# Patient Record
Sex: FEMALE | Race: WHITE | NOT HISPANIC OR LATINO | Employment: FULL TIME | URBAN - NONMETROPOLITAN AREA
[De-identification: names, ages, dates, MRNs, and addresses within clinical notes are randomized per-mention and may not be internally consistent; named-entity substitution may affect disease eponyms.]

---

## 2017-07-18 ENCOUNTER — OFFICE VISIT (OUTPATIENT)
Dept: FAMILY MEDICINE CLINIC | Facility: CLINIC | Age: 57
End: 2017-07-18

## 2017-07-18 VITALS
SYSTOLIC BLOOD PRESSURE: 112 MMHG | DIASTOLIC BLOOD PRESSURE: 74 MMHG | TEMPERATURE: 97.3 F | OXYGEN SATURATION: 98 % | HEIGHT: 63 IN | BODY MASS INDEX: 20.34 KG/M2 | HEART RATE: 70 BPM | WEIGHT: 114.8 LBS

## 2017-07-18 DIAGNOSIS — H60.501 ACUTE OTITIS EXTERNA OF RIGHT EAR, UNSPECIFIED TYPE: ICD-10-CM

## 2017-07-18 DIAGNOSIS — Z72.0 TOBACCO ABUSE: Chronic | ICD-10-CM

## 2017-07-18 DIAGNOSIS — R53.83 FATIGUE, UNSPECIFIED TYPE: ICD-10-CM

## 2017-07-18 DIAGNOSIS — H61.21 IMPACTED CERUMEN OF RIGHT EAR: ICD-10-CM

## 2017-07-18 DIAGNOSIS — R19.4 CHANGE IN STOOL HABITS: ICD-10-CM

## 2017-07-18 DIAGNOSIS — R63.4 WEIGHT LOSS, UNINTENTIONAL: ICD-10-CM

## 2017-07-18 DIAGNOSIS — Z12.31 VISIT FOR SCREENING MAMMOGRAM: ICD-10-CM

## 2017-07-18 DIAGNOSIS — F41.1 GAD (GENERALIZED ANXIETY DISORDER): Primary | Chronic | ICD-10-CM

## 2017-07-18 DIAGNOSIS — K21.00 GASTROESOPHAGEAL REFLUX DISEASE WITH ESOPHAGITIS: Chronic | ICD-10-CM

## 2017-07-18 PROCEDURE — 69210 REMOVE IMPACTED EAR WAX UNI: CPT | Performed by: INTERNAL MEDICINE

## 2017-07-18 PROCEDURE — 99214 OFFICE O/P EST MOD 30 MIN: CPT | Performed by: INTERNAL MEDICINE

## 2017-07-18 RX ORDER — NEOMYCIN SULFATE, POLYMYXIN B SULFATE AND HYDROCORTISONE 10; 3.5; 1 MG/ML; MG/ML; [USP'U]/ML
3 SUSPENSION/ DROPS AURICULAR (OTIC) 3 TIMES DAILY
Qty: 5 ML | Refills: 0 | Status: SHIPPED | OUTPATIENT
Start: 2017-07-18 | End: 2017-07-25

## 2017-07-18 NOTE — PROGRESS NOTES
Subjective        History of Present Illness     Reyna Mayfield is a 57 y.o. female who presents today to [facility] care.  Her  is a retired  and works at [employer].  She is a  at [employer] in [place].  Her medical history includes GERD, MALISSA, major depressive disorder, and history of colon polyps.  She reports past history of heart murmur, but has not been told this in several years.  She reports she was intolerant to antidepressants.  She denies current depression, but admits to anxiety.       Surgical history includes history of nissen fundoplication in [place] approximately 17 years ago.  She had cholecystectomy at age 22.  She was told to continue on Nexium following her nissen fundoplication.  She has not taken the Nexium in a couple of years because she was not having symptoms.        Family history includes diabetes and breast cancer.     She reports she is overdue for mammogram.  We will send an order for her to schedule mammogram.   She has a sister with breast cancer.  She had an appointment with Nita Lara for pap smear, which she cancelled.  I recommended she reschedule.     She reports history of post-cholecystectomy diarrhea.   She is reporting constipation with frequent hard painful stool.  She reports having bowel movements every two days. She is reporting change in stool with the caliber of the stool varying.    She denies GERD symptoms.  She reports an approximately 10-15 pound unintentional weight loss.  She reports she has had two colonoscopies, by Dr. Moyer and reporting one polyp found.  The most recent colonoscopy was 4-5 years ago.  She denies hemoptysis or evidence of GI blood loss.      She has a family of heart disease.  She does admit to tobacco use.  I advised the patient of the risks in continuing to use tobacco products.  Patient is urged to stop smoking and never start again. Cessation aids are offered.  She has nicotine patches  "and plans to start those.       She went to the ER on 07/08/17 and was diagnosed with vertigo.  She was then seen at Urgent Care on 07/15/17 and treated for right otitis media with a 10 day course of amoxicillin and Mucinex 600 b.i.d. as well as Debrox otic drops.  She reports the vertigo has improved.        Review of Systems   Constitutional: Positive for unexpected weight change. Negative for chills, fatigue and fever.   HENT: Negative for congestion, ear pain, postnasal drip, sinus pressure and sore throat.    Respiratory: Negative for cough, shortness of breath and wheezing.    Cardiovascular: Negative for chest pain, palpitations and leg swelling.   Gastrointestinal: Negative for abdominal pain, blood in stool, constipation, diarrhea, nausea and vomiting.   Endocrine: Negative for cold intolerance, heat intolerance, polydipsia and polyuria.   Genitourinary: Negative for dysuria, frequency, hematuria and urgency.   Skin: Negative for rash.   Neurological: Negative for syncope and weakness.      PHQ-9 Depression Screening 7/18/2017   Little interest or pleasure in doing things 1   Feeling down, depressed, or hopeless 0   PHQ-9 Total Score 1     Objective     Vitals:    07/18/17 0851   BP: 112/74   Pulse: 70   Temp: 97.3 °F (36.3 °C)   SpO2: 98%   Weight: 114 lb 12.8 oz (52.1 kg)   Height: 63\" (160 cm)     Physical Exam   Constitutional: She is oriented to person, place, and time. She appears well-developed and well-nourished. No distress.   Very thin female.    HENT:   Head: Normocephalic and atraumatic.   Nose: Right sinus exhibits no maxillary sinus tenderness and no frontal sinus tenderness. Left sinus exhibits no maxillary sinus tenderness and no frontal sinus tenderness.   Mouth/Throat: Uvula is midline, oropharynx is clear and moist and mucous membranes are normal. No oral lesions. No tonsillar exudate.   Benign posterior smoker's changes noted.  Left-sided nasal congestion.  Cerumen impaction right ear " canal.     Eyes: Conjunctivae and EOM are normal. Pupils are equal, round, and reactive to light.   Neck: Trachea normal. Neck supple. No JVD present. Carotid bruit is not present. No tracheal deviation present. No thyroid mass and no thyromegaly present.   Cardiovascular: Normal rate, regular rhythm and normal heart sounds.   No extrasystoles are present. PMI is not displaced.    No murmur heard.  Pulmonary/Chest: Effort normal and breath sounds normal. No accessory muscle usage. No respiratory distress. She has no decreased breath sounds. She has no wheezes. She has no rhonchi. She has no rales.   Abdominal: Soft. Bowel sounds are normal. She exhibits no distension. There is no hepatosplenomegaly. There is tenderness.   Significant epigastric tenderness.  Mild left lower quadrant abdominal tenderness.         Vascular Status -  Her exam exhibits right foot vasculature normal. Her exam exhibits no right foot edema. Her exam exhibits left foot vasculature normal. Her exam exhibits no left foot edema.  Lymphadenopathy:     She has no cervical adenopathy.   Neurological: She is alert and oriented to person, place, and time. No cranial nerve deficit. Coordination normal.   Skin: Skin is warm, dry and intact. No rash noted. No cyanosis. Nails show no clubbing.   Psychiatric: She has a normal mood and affect. Her speech is normal and behavior is normal. Thought content normal.   Vitals reviewed.         Assessment/Plan      With her unexplained weight loss,  change in bowel habits, fatigue, and history of tobacco use, we will get a chest x-ray  today.  I advised the patient of the risks in continuing to use tobacco products.  Patient is urged to stop smoking and never start again. Cessation aids are offered.  She reports she has nicotine patches at home and plans to start using these.      We will send an order for her to schedule mammogram.  Recommended she reschedule appointment for pap smear with Nita Hong  JACINDA Lara.      She will return in the morning to have labs completed including CBC, CMP, TSH, vitamin B-12, and vitamin D.  We will schedule a return visit in one month to review results.      Refer to GI, Dr. Masters, for evaluation of unintentional weight loss and change of bowel habits, as well as her history of GERD requiring Nissen fundoplication.    She had epigastric tenderness on abdominal exam today.    Cortisporin otic drops to the right ear for the next week.  I recommended Debrox eardrops monthly to both years for prevention of recurrence of cerumen impactions.    Ear Cerumen Removal Instrumentation  Date/Time: 7/18/2017 10:27 AM  Performed by: TATO LEE  Authorized by: TATO LEE  Local anesthetic: none  cerumenolytic applied: Ceruminolytics applied prior to the procedure.  Location details: right ear  Procedure type: irrigation  Patient sedated: no  Patient tolerance: Patient tolerated the procedure well with no immediate complications          After obtaining verbal informed consent, warm water lavage irrigation is performed today to right ear canal without difficulty or complication.        Scribed for Dr. Lee by Mihaela Calderón [facility].     Diagnoses and all orders for this visit:    MALISSA (generalized anxiety disorder)  -     Vitamin D 25 Hydroxy; Future    Weight loss, unintentional  -     CBC Auto Differential; Future  -     Comprehensive Metabolic Panel; Future  -     Vitamin D 25 Hydroxy; Future  -     XR Chest PA & Lateral  -     Ambulatory Referral to Gastroenterology    Tobacco abuse  -     Vitamin D 25 Hydroxy; Future  -     XR Chest PA & Lateral    Gastroesophageal reflux disease with esophagitis  -     Vitamin D 25 Hydroxy; Future  -     Ambulatory Referral to Gastroenterology    Change in stool habits  -     Vitamin D 25 Hydroxy; Future    Fatigue, unspecified type  -     Vitamin B12; Future  -     CBC Auto Differential; Future  -     Comprehensive Metabolic Panel; Future  -      TSH; Future  -     Vitamin D 25 Hydroxy; Future    Impacted cerumen of right ear    Visit for screening mammogram  -     Mammo Screening Digital Tomosynthesis Bilateral With CAD; Future    Acute otitis externa of right ear, unspecified type    Other orders  -     neomycin-polymyxin-hydrocortisone (CORTISPORIN) 3.5-74519-8 otic suspension; Administer 3 drops to the right ear 3 (Three) Times a Day for 7 days.      No visits with results within 3 Week(s) from this visit.  Latest known visit with results is:    Abstract on 02/22/2017   Component Date Value Ref Range Status   •  Mammogram 02/05/2016 completed   Final   •  Pap smear 02/10/2016 completed   Final   ]

## 2017-07-19 ENCOUNTER — LAB (OUTPATIENT)
Dept: LAB | Facility: OTHER | Age: 57
End: 2017-07-19

## 2017-07-19 DIAGNOSIS — R19.4 CHANGE IN STOOL HABITS: ICD-10-CM

## 2017-07-19 DIAGNOSIS — Z72.0 TOBACCO ABUSE: Chronic | ICD-10-CM

## 2017-07-19 DIAGNOSIS — R63.4 WEIGHT LOSS, UNINTENTIONAL: ICD-10-CM

## 2017-07-19 DIAGNOSIS — R53.83 FATIGUE, UNSPECIFIED TYPE: ICD-10-CM

## 2017-07-19 DIAGNOSIS — F41.1 GAD (GENERALIZED ANXIETY DISORDER): Chronic | ICD-10-CM

## 2017-07-19 DIAGNOSIS — K21.00 GASTROESOPHAGEAL REFLUX DISEASE WITH ESOPHAGITIS: Chronic | ICD-10-CM

## 2017-07-19 LAB
ALBUMIN SERPL-MCNC: 3.7 G/DL (ref 3.2–5.5)
ALBUMIN/GLOB SERPL: 1.3 G/DL (ref 1–3)
ALP SERPL-CCNC: 61 U/L (ref 15–121)
ALT SERPL W P-5'-P-CCNC: 16 U/L (ref 10–60)
ANION GAP SERPL CALCULATED.3IONS-SCNC: 7 MMOL/L (ref 5–15)
AST SERPL-CCNC: 19 U/L (ref 10–60)
BASOPHILS # BLD AUTO: 0.03 10*3/MM3 (ref 0–0.2)
BASOPHILS NFR BLD AUTO: 0.4 % (ref 0–2)
BILIRUB SERPL-MCNC: 0.3 MG/DL (ref 0.2–1)
BUN BLD-MCNC: 11 MG/DL (ref 8–25)
BUN/CREAT SERPL: 18.3 (ref 7–25)
CALCIUM SPEC-SCNC: 8.6 MG/DL (ref 8.4–10.8)
CHLORIDE SERPL-SCNC: 106 MMOL/L (ref 100–112)
CO2 SERPL-SCNC: 28 MMOL/L (ref 20–32)
CREAT BLD-MCNC: 0.6 MG/DL (ref 0.4–1.3)
DEPRECATED RDW RBC AUTO: 45.1 FL (ref 36.4–46.3)
EOSINOPHIL # BLD AUTO: 0.16 10*3/MM3 (ref 0–0.7)
EOSINOPHIL NFR BLD AUTO: 2.3 % (ref 0–7)
ERYTHROCYTE [DISTWIDTH] IN BLOOD BY AUTOMATED COUNT: 14 % (ref 11.5–14.5)
GFR SERPL CREATININE-BSD FRML MDRD: 104 ML/MIN/1.73 (ref 51–120)
GLOBULIN UR ELPH-MCNC: 2.8 GM/DL (ref 2.5–4.6)
GLUCOSE BLD-MCNC: 98 MG/DL (ref 70–100)
HCT VFR BLD AUTO: 42.3 % (ref 35–45)
HGB BLD-MCNC: 14.1 G/DL (ref 12–15.5)
LYMPHOCYTES # BLD AUTO: 2.97 10*3/MM3 (ref 0.6–4.2)
LYMPHOCYTES NFR BLD AUTO: 42.5 % (ref 10–50)
MCH RBC QN AUTO: 29.8 PG (ref 26.5–34)
MCHC RBC AUTO-ENTMCNC: 33.3 G/DL (ref 31.4–36)
MCV RBC AUTO: 89.4 FL (ref 80–98)
MONOCYTES # BLD AUTO: 0.4 10*3/MM3 (ref 0–0.9)
MONOCYTES NFR BLD AUTO: 5.7 % (ref 0–12)
NEUTROPHILS # BLD AUTO: 3.43 10*3/MM3 (ref 2–8.6)
NEUTROPHILS NFR BLD AUTO: 49.1 % (ref 37–80)
PLATELET # BLD AUTO: 162 10*3/MM3 (ref 150–450)
PMV BLD AUTO: 10.2 FL (ref 8–12)
POTASSIUM BLD-SCNC: 4.2 MMOL/L (ref 3.4–5.4)
PROT SERPL-MCNC: 6.5 G/DL (ref 6.7–8.2)
RBC # BLD AUTO: 4.73 10*6/MM3 (ref 3.77–5.16)
SODIUM BLD-SCNC: 141 MMOL/L (ref 134–146)
WBC NRBC COR # BLD: 6.99 10*3/MM3 (ref 3.2–9.8)

## 2017-07-19 PROCEDURE — 36415 COLL VENOUS BLD VENIPUNCTURE: CPT | Performed by: INTERNAL MEDICINE

## 2017-07-19 PROCEDURE — 80053 COMPREHEN METABOLIC PANEL: CPT | Performed by: INTERNAL MEDICINE

## 2017-07-19 PROCEDURE — 84443 ASSAY THYROID STIM HORMONE: CPT | Performed by: INTERNAL MEDICINE

## 2017-07-19 PROCEDURE — 82607 VITAMIN B-12: CPT | Performed by: INTERNAL MEDICINE

## 2017-07-19 PROCEDURE — 82306 VITAMIN D 25 HYDROXY: CPT | Performed by: INTERNAL MEDICINE

## 2017-07-19 PROCEDURE — 85025 COMPLETE CBC W/AUTO DIFF WBC: CPT | Performed by: INTERNAL MEDICINE

## 2017-07-20 LAB
25(OH)D3 SERPL-MCNC: 36.6 NG/ML (ref 30–100)
TSH SERPL DL<=0.05 MIU/L-ACNC: 1.71 MIU/ML (ref 0.46–4.68)
VIT B12 BLD-MCNC: 618 PG/ML (ref 239–931)

## 2017-07-25 ENCOUNTER — OFFICE VISIT (OUTPATIENT)
Dept: OBSTETRICS AND GYNECOLOGY | Facility: CLINIC | Age: 57
End: 2017-07-25

## 2017-07-25 VITALS
SYSTOLIC BLOOD PRESSURE: 110 MMHG | WEIGHT: 117.8 LBS | RESPIRATION RATE: 18 BRPM | HEIGHT: 63 IN | BODY MASS INDEX: 20.87 KG/M2 | DIASTOLIC BLOOD PRESSURE: 70 MMHG | HEART RATE: 65 BPM

## 2017-07-25 DIAGNOSIS — Z01.419 ENCOUNTER FOR GYNECOLOGICAL EXAMINATION WITH PAPANICOLAOU SMEAR OF CERVIX: Primary | ICD-10-CM

## 2017-07-25 PROCEDURE — 99396 PREV VISIT EST AGE 40-64: CPT | Performed by: NURSE PRACTITIONER

## 2017-07-25 PROCEDURE — 88142 CYTOPATH C/V THIN LAYER: CPT | Performed by: PATHOLOGY

## 2017-07-25 NOTE — PROGRESS NOTES
Subjective   Chief Complaint   Patient presents with   • Gynecologic Exam     well woman annual visit     Reyna Mayfield is a 57 y.o. year old  presenting to be seen for her annual exam.  Pt has no complaints.     She is not sexually active.  In the past 12 months there has not been new sexual partners.  Condoms are not typically used.  She would not like to be screened for STD's at today's exam.     She exercises regularly: no.  She wears her seat belt:yes.  She has concerns about domestic violence: no.  She is taking Vit D and Calcium:no  Last colonoscopy: approximately , is being referred to GI for unexplained weight loss and GI changes.   Last DEXA: Never  Last MM16  Last PAP: 2/3/16  Hx of abnormal pap: No      NATURAL MENOPAUSE  LMP: LMP 10 years ago    OB History      Para Term  AB TAB SAB Ectopic Multiple Living    3 3                No Additional Complaints Reported    The following portions of the patient's history were reviewed and updated as appropriate:problem list, current medications, allergies, past family history, past medical history, past social history and past surgical history.    Smoking status: Current Every Day Smoker                                                   Packs/day: 1.00      Years: 0.00      Smokeless status: Never Used                      Ready to quit: Yes  Counseling given: Yes      Review of Systems   Constitutional: Negative.    Respiratory: Negative.    Cardiovascular: Negative.    Gastrointestinal: Positive for abdominal pain and constipation.   Endocrine: Negative.    Genitourinary: Negative.  Negative for pelvic pain and vaginal pain.   Skin: Negative.    Neurological: Negative for dizziness, syncope, light-headedness and headaches.   Psychiatric/Behavioral: Negative for suicidal ideas. The patient is not nervous/anxious.          Objective   /70 (BP Location: Right arm, Patient Position: Sitting, Cuff Size: Adult)  Pulse 65  Resp 18  " Ht 63\" (160 cm)  Wt 117 lb 12.8 oz (53.4 kg)  LMP  (LMP Unknown)  Breastfeeding? No  BMI 20.87 kg/m2    General:  well developed; well nourished  no acute distress   Skin:  No suspicious lesions seen   Cardiac: Heart sounds are normal.  Regular rate and rhythm without murmur, gallop or rub.   Resp:  Normal expansion.  Clear to auscultation.  No rales, rhonchi, or wheezing.   Thyroid: not examined   Breasts:  Examined in supine position  Symmetric without masses or skin dimpling  Nipples normal without inversion, lesions or discharge  There are no palpable axillary nodes  Fibrocystic changes are present both breasts without a discrete mass   Abdomen: soft, non-tender; no masses  no umbilical or inginual hernias are present  no hepato-splenomegaly  Normal findings: bowel sounds normal   Psych: alert,oriented, in NAD with a full range of affect, normal behavior and no psychotic features   Pelvis: Clinical staff was present for exam  External genitalia:  normal appearance of the external genitalia including Bartholin's and Jessie's glands. small skin tag note on left buttock adjacent to anus  :  urethral meatus normal; urethral hypermobility is absent.  Vaginal:  normal pink mucosa without prolapse or lesions.  Cervix:  normal appearance.  Uterus:  normal size, shape and consistency.  Adnexa:  non palpable bilaterally.  Rectal:  anus visually normal appearing. recto-vaginal exam unremarkable and confirms findings; guaiac negative;     Lab Review   No data reviewed    Imaging  Mammogram report       Assessment   1. Encounter for GYN exam with pap smear of cervix     Plan   1. Pap results: I will send card in mail or call if abnormal. Pt states mammogram is scheduled next week. I see no record of scheduling. Diagnostics will call to schedule. RTC in 2 years for well woman exam.            This note was electronically signed.    Nita Solomon, JACINDA  July 25, 2017  "

## 2017-07-31 LAB
LAB AP CASE REPORT: NORMAL
LAB AP GYN ADDITIONAL INFORMATION: NORMAL
LAB AP GYN OTHER FINDINGS: NORMAL
Lab: NORMAL
PATH INTERP SPEC-IMP: NORMAL
STAT OF ADQ CVX/VAG CYTO-IMP: NORMAL

## 2017-08-22 ENCOUNTER — OFFICE VISIT (OUTPATIENT)
Dept: FAMILY MEDICINE CLINIC | Facility: CLINIC | Age: 57
End: 2017-08-22

## 2017-08-22 VITALS
WEIGHT: 118.4 LBS | HEART RATE: 64 BPM | BODY MASS INDEX: 20.98 KG/M2 | TEMPERATURE: 98.1 F | DIASTOLIC BLOOD PRESSURE: 58 MMHG | SYSTOLIC BLOOD PRESSURE: 96 MMHG | HEIGHT: 63 IN

## 2017-08-22 DIAGNOSIS — F41.1 GAD (GENERALIZED ANXIETY DISORDER): Chronic | ICD-10-CM

## 2017-08-22 DIAGNOSIS — G47.9 SLEEP DISORDER: ICD-10-CM

## 2017-08-22 DIAGNOSIS — Z86.010 HISTORY OF COLONIC POLYPS: Chronic | ICD-10-CM
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
## 2017-08-22 DIAGNOSIS — K21.00 GASTROESOPHAGEAL REFLUX DISEASE WITH ESOPHAGITIS: Chronic | ICD-10-CM

## 2017-08-22 DIAGNOSIS — Z72.0 TOBACCO ABUSE: Primary | Chronic | ICD-10-CM

## 2017-08-22 DIAGNOSIS — R63.4 WEIGHT LOSS, UNINTENTIONAL: Chronic | ICD-10-CM

## 2017-08-22 PROCEDURE — 99214 OFFICE O/P EST MOD 30 MIN: CPT | Performed by: INTERNAL MEDICINE

## 2017-08-22 RX ORDER — VARENICLINE TARTRATE 1 MG/1
0.5 TABLET, FILM COATED ORAL 2 TIMES DAILY
Qty: 30 TABLET | Refills: 6 | Status: SHIPPED | OUTPATIENT
Start: 2017-08-22 | End: 2017-08-31 | Stop reason: SDUPTHER

## 2017-08-22 NOTE — PROGRESS NOTES
Subjective        History of Present Illness     Reyna Mayfield is a 57 y.o. female who presents for a 1-month follow up on chronic conditions including GERD, MALISSA, major depressive disorder, and history of colon polyps.  Her  is a retired  and works at [employer].  She is a  at [employer] in [place].  She was here in July to establish care and returns today to review lab results.     She has an appointment with Renato Ugarte next month for unintentional weight loss and change of bowel habits.  She stopped her Nexium after her GERD symptoms improved.  Her weight is up 4 pounds in the past month.  She reports she has been consuming more calories.  Her recent labs do not reveal concerning liver abnormality or anemia.  She had cholecystectomy at age 22.  She also has history of nissen fundoplication 17 years ago    She continues to smoke about a pack of cigarettes daily.  I advised the patient of the risks in continuing to use tobacco products.  Patient is urged to stop smoking and never start again.  We discussed how cessation aids are covered by insurance under a new state law.  We discussed cessation aids and I recommended Chantix.   With her unexplained weight loss, change in bowel habits, fatigue, and history of tobacco use, we sent her for chest x-ray last month.  Chest x-ray revealed increased interstitial markings which may be chronic, but no nodule or mass was identified.      She continues to have difficulty staying asleep at night.  She has tried several antidepressants in the past, but felt these exacerbated her symptoms.  She reports she felt suicidal on the antidepressants.   She is currently using 1/2 tablet of melatonin to help sleep at night.  She reports she feels sedated the following day if she takes the whole tablet.       She has multiple pruritic insect bites, which being in the woods.  She has been applying clear nail polish without resolution.   I recommended  "applying hydrocortisone to the insect bites for the pruritis.       She had GYN exam and pap smear by Nita Lara.           She went to the ER on 07/08/17 and was diagnosed with vertigo.  She reports her symptoms resolved with a  10 day course of amoxicillin and Mucinex 600.     Blood pressure is at goal.      The patient's relevant past medical, surgical, and social history was reviewed in Epic.   Lab results are reviewed with the patient today. CBC unremarkable.  Fasting glucose 98.  Vitamin B-12 and vitamin D at goal.  Liver and renal function normal.  Thyroid function at goal.        Review of Systems   Constitutional: Negative for chills, fatigue and fever.   HENT: Negative for congestion, ear pain, postnasal drip, sinus pressure and sore throat.    Respiratory: Negative for cough, shortness of breath and wheezing.    Cardiovascular: Negative for chest pain, palpitations and leg swelling.   Gastrointestinal: Negative for abdominal pain, blood in stool, constipation, diarrhea, nausea and vomiting.   Endocrine: Negative for cold intolerance, heat intolerance, polydipsia and polyuria.   Genitourinary: Negative for dysuria, frequency, hematuria and urgency.   Skin: Negative for rash.   Neurological: Negative for syncope and weakness.      PHQ-9 Depression Screening 7/18/2017   Little interest or pleasure in doing things 1   Feeling down, depressed, or hopeless 0   PHQ-9 Total Score 1       Objective     Vitals:    08/22/17 1102   BP: 96/58   Pulse: 64   Temp: 98.1 °F (36.7 °C)   TempSrc: Oral   Weight: 118 lb 6.4 oz (53.7 kg)   Height: 63\" (160 cm)     Physical Exam   Constitutional: She is oriented to person, place, and time. She appears well-developed and well-nourished. No distress.   Very thin female.    HENT:   Head: Normocephalic and atraumatic.   Nose: Right sinus exhibits no maxillary sinus tenderness and no frontal sinus tenderness. Left sinus exhibits no maxillary sinus tenderness and no " frontal sinus tenderness.   Mouth/Throat: Uvula is midline, oropharynx is clear and moist and mucous membranes are normal. No oral lesions. No tonsillar exudate.   Benign posterior smoker's changes noted.     Eyes: Conjunctivae and EOM are normal. Pupils are equal, round, and reactive to light.   Neck: Trachea normal. Neck supple. No JVD present. Carotid bruit is not present. No tracheal deviation present. No thyroid mass and no thyromegaly present.   Cardiovascular: Normal rate, regular rhythm and normal heart sounds.   No extrasystoles are present. PMI is not displaced.    No murmur heard.  Pulmonary/Chest: Effort normal and breath sounds normal. No accessory muscle usage. No respiratory distress. She has no decreased breath sounds. She has no wheezes. She has no rhonchi. She has no rales.   Chronic lung sounds.    Abdominal: Soft. Bowel sounds are normal. She exhibits no distension. There is no hepatosplenomegaly. There is no tenderness.       Vascular Status -  Her exam exhibits right foot vasculature normal. Her exam exhibits no right foot edema. Her exam exhibits left foot vasculature normal. Her exam exhibits no left foot edema.  Lymphadenopathy:     She has no cervical adenopathy.   Neurological: She is alert and oriented to person, place, and time. No cranial nerve deficit. Coordination normal.   Skin: Skin is warm, dry and intact. No rash noted. No cyanosis. Nails show no clubbing.   A few scattered pruritic insect bites are noted on the back of the right shoulder and right posterior neck.  She reports some similar pruritic insect bites between the breasts.  No evidence of secondary cellulitis.  No annular rash.   Psychiatric: She has a normal mood and affect. Her speech is normal and behavior is normal. Thought content normal.   Vitals reviewed.    Future Appointments  Date Time Provider Department Center   9/19/2017 10:40 AM CARLINE Biswas [facility] None   7/23/2018 9:00 AM MD DANIE Carrera  [facility] None         Assessment/Plan      Start Chantix 1 mg to take 1/2 tablet each morning. She may need to increase to 1/2 tablet twice a day in a couple weeks if well tolerated.  Do not take the medication if she experiences significant side effects.  After she has decreased to approximately 1/2 pack of cigarettes daily, I recommended she pick a stop date.  We discussed importance of staying on the medication for six months for the maximum efficacy.      Monitor weight closely and notify me if there is any additional unexplained weight loss.  She will be seeing the GI service for a GI workup in September.  See last note for more details.    Continue with 1/2 tablet of the melatonin for sleep.      Recommended she apply hydrocortisone to the pruritic insect bites.      Keep the appointment with Renato Ugarte next month.     Continue other medications and vitamin and mineral supplements to treat additional medical problems which we addressed today.  She will follow up next summer for annual follow up with fasting labs one week prior.       Scribed for Dr. Cochran by Mihaela Calderón [facility].     Diagnoses and all orders for this visit:    Tobacco abuse    Weight loss, unintentional  -     Comprehensive Metabolic Panel; Future  -     CBC Auto Differential; Future  -     TSH; Future    Gastroesophageal reflux disease with esophagitis  -     Comprehensive Metabolic Panel; Future  -     CBC Auto Differential; Future  -     TSH; Future    MALISSA (generalized anxiety disorder)    History of colonic polyps    Sleep disorder    Insect bites, initial encounter    Other orders  -     varenicline (CHANTIX CONTINUING MONTH PAK) 1 MG tablet; Take 0.5 tablets by mouth 2 (Two) Times a Day.        No visits with results within 3 Week(s) from this visit.  Latest known visit with results is:    Office Visit on 07/25/2017   Component Date Value Ref Range Status   • Case Report 07/25/2017    Final                    Value:Gynecologic  Cytology Report                       Case: [id number]                                  Authorizing Provider:  Nita Duvalgh              Collected:           07/25/2017 11:05 AM                                 JACINDA Solomon                                                         Ordering Location:     [facility]     Received:            07/25/2017 11:35 AM                                 [facility]                                                               First Screen:          Nyla Graff                                                              Rescreen:              Benjamin Gavin MD                                                         Specimen:    Liquid-Based Pap, Screening, Cervix                                                       • Interpretation 07/25/2017 Negative for intraepithelial lesion or malignancy    Final   • Other Findings 07/25/2017 Mild inflammation   Final   • Specimen Adequacy 07/25/2017 Satisfactory for evaluation, endocervical/transformation zone component present   Final   • Additional Information 07/25/2017    Final                    Value:This result contains rich text formatting which cannot be displayed here.   ]

## 2017-08-23 ENCOUNTER — DOCUMENTATION (OUTPATIENT)
Dept: FAMILY MEDICINE CLINIC | Facility: CLINIC | Age: 57
End: 2017-08-23

## 2017-08-23 NOTE — PROGRESS NOTES
Insurance requires that Chantix fills through mail order.  I have notified the patient to contact the pcp to have this medication sent to mail order when she gets a mail order account established if not already.

## 2017-08-25 RX ORDER — MECLIZINE HYDROCHLORIDE 25 MG/1
25 TABLET ORAL 3 TIMES DAILY PRN
Qty: 60 TABLET | Refills: 2 | Status: SHIPPED | OUTPATIENT
Start: 2017-08-25 | End: 2018-03-20 | Stop reason: SDUPTHER

## 2017-08-31 RX ORDER — VARENICLINE TARTRATE 1 MG/1
0.5 TABLET, FILM COATED ORAL 2 TIMES DAILY
Qty: 90 TABLET | Refills: 1 | Status: SHIPPED | OUTPATIENT
Start: 2017-08-31 | End: 2017-11-06 | Stop reason: ALTCHOICE

## 2017-09-19 ENCOUNTER — OFFICE VISIT (OUTPATIENT)
Dept: GASTROENTEROLOGY | Facility: CLINIC | Age: 57
End: 2017-09-19

## 2017-09-19 VITALS
HEIGHT: 63 IN | WEIGHT: 119 LBS | DIASTOLIC BLOOD PRESSURE: 68 MMHG | BODY MASS INDEX: 21.09 KG/M2 | SYSTOLIC BLOOD PRESSURE: 108 MMHG | HEART RATE: 63 BPM

## 2017-09-19 DIAGNOSIS — R19.4 CHANGE IN BOWEL HABITS: ICD-10-CM

## 2017-09-19 DIAGNOSIS — R19.7 DIARRHEA, UNSPECIFIED TYPE: ICD-10-CM

## 2017-09-19 DIAGNOSIS — R63.4 WEIGHT LOSS, ABNORMAL: Primary | ICD-10-CM

## 2017-09-19 DIAGNOSIS — K21.9 GASTROESOPHAGEAL REFLUX DISEASE, ESOPHAGITIS PRESENCE NOT SPECIFIED: ICD-10-CM

## 2017-09-19 PROCEDURE — 99243 OFF/OP CNSLTJ NEW/EST LOW 30: CPT | Performed by: PHYSICIAN ASSISTANT

## 2017-09-19 RX ORDER — DEXTROSE AND SODIUM CHLORIDE 5; .45 G/100ML; G/100ML
30 INJECTION, SOLUTION INTRAVENOUS CONTINUOUS PRN
Status: CANCELLED | OUTPATIENT
Start: 2017-11-10

## 2017-09-19 RX ORDER — SODIUM, POTASSIUM,MAG SULFATES 17.5-3.13G
1 SOLUTION, RECONSTITUTED, ORAL ORAL ONCE
Qty: 1 BOTTLE | Refills: 0 | Status: SHIPPED | OUTPATIENT
Start: 2017-09-19 | End: 2017-09-19

## 2017-10-27 ENCOUNTER — APPOINTMENT (OUTPATIENT)
Dept: ULTRASOUND IMAGING | Facility: HOSPITAL | Age: 57
End: 2017-10-27

## 2017-11-10 ENCOUNTER — HOSPITAL ENCOUNTER (OUTPATIENT)
Facility: HOSPITAL | Age: 57
Setting detail: HOSPITAL OUTPATIENT SURGERY
Discharge: HOME | End: 2017-11-10
Attending: INTERNAL MEDICINE | Admitting: INTERNAL MEDICINE

## 2017-11-10 ENCOUNTER — APPOINTMENT (OUTPATIENT)
Dept: ULTRASOUND IMAGING | Facility: HOSPITAL | Age: 57
End: 2017-11-10

## 2017-11-10 ENCOUNTER — HOSPITAL ENCOUNTER (OUTPATIENT)
Dept: ULTRASOUND IMAGING | Facility: HOSPITAL | Age: 57
Discharge: HOME | End: 2017-11-10

## 2017-11-10 ENCOUNTER — ANESTHESIA (OUTPATIENT)
Dept: GASTROENTEROLOGY | Facility: HOSPITAL | Age: 57
End: 2017-11-10

## 2017-11-10 ENCOUNTER — ANESTHESIA EVENT (OUTPATIENT)
Dept: GASTROENTEROLOGY | Facility: HOSPITAL | Age: 57
End: 2017-11-10

## 2017-11-10 VITALS
DIASTOLIC BLOOD PRESSURE: 59 MMHG | HEART RATE: 68 BPM | TEMPERATURE: 97.1 F | SYSTOLIC BLOOD PRESSURE: 94 MMHG | RESPIRATION RATE: 17 BRPM | BODY MASS INDEX: 20.47 KG/M2 | WEIGHT: 115.52 LBS | HEIGHT: 63 IN | OXYGEN SATURATION: 99 %

## 2017-11-10 DIAGNOSIS — R19.4 CHANGE IN BOWEL HABITS: ICD-10-CM

## 2017-11-10 DIAGNOSIS — R63.4 WEIGHT LOSS, ABNORMAL: ICD-10-CM

## 2017-11-10 DIAGNOSIS — K21.9 GASTROESOPHAGEAL REFLUX DISEASE, ESOPHAGITIS PRESENCE NOT SPECIFIED: ICD-10-CM

## 2017-11-10 DIAGNOSIS — R19.7 DIARRHEA, UNSPECIFIED TYPE: ICD-10-CM

## 2017-11-10 PROCEDURE — 45380 COLONOSCOPY AND BIOPSY: CPT | Performed by: INTERNAL MEDICINE

## 2017-11-10 PROCEDURE — 76700 US EXAM ABDOM COMPLETE: CPT

## 2017-11-10 PROCEDURE — 88313 SPECIAL STAINS GROUP 2: CPT | Performed by: INTERNAL MEDICINE

## 2017-11-10 PROCEDURE — 88313 SPECIAL STAINS GROUP 2: CPT | Performed by: PATHOLOGY

## 2017-11-10 PROCEDURE — 25010000002 PROPOFOL 10 MG/ML EMULSION: Performed by: NURSE ANESTHETIST, CERTIFIED REGISTERED

## 2017-11-10 PROCEDURE — 88305 TISSUE EXAM BY PATHOLOGIST: CPT | Performed by: PATHOLOGY

## 2017-11-10 PROCEDURE — 43239 EGD BIOPSY SINGLE/MULTIPLE: CPT | Performed by: INTERNAL MEDICINE

## 2017-11-10 PROCEDURE — 88305 TISSUE EXAM BY PATHOLOGIST: CPT | Performed by: INTERNAL MEDICINE

## 2017-11-10 RX ORDER — PROMETHAZINE HYDROCHLORIDE 25 MG/ML
12.5 INJECTION, SOLUTION INTRAMUSCULAR; INTRAVENOUS ONCE AS NEEDED
Status: DISCONTINUED | OUTPATIENT
Start: 2017-11-10 | End: 2017-11-10 | Stop reason: HOSPADM

## 2017-11-10 RX ORDER — LIDOCAINE HYDROCHLORIDE 10 MG/ML
INJECTION, SOLUTION INFILTRATION; PERINEURAL AS NEEDED
Status: DISCONTINUED | OUTPATIENT
Start: 2017-11-10 | End: 2017-11-10 | Stop reason: SURG

## 2017-11-10 RX ORDER — DEXAMETHASONE SODIUM PHOSPHATE 4 MG/ML
8 INJECTION, SOLUTION INTRA-ARTICULAR; INTRALESIONAL; INTRAMUSCULAR; INTRAVENOUS; SOFT TISSUE ONCE AS NEEDED
Status: DISCONTINUED | OUTPATIENT
Start: 2017-11-10 | End: 2017-11-10 | Stop reason: HOSPADM

## 2017-11-10 RX ORDER — PROMETHAZINE HYDROCHLORIDE 25 MG/1
25 TABLET ORAL ONCE AS NEEDED
Status: DISCONTINUED | OUTPATIENT
Start: 2017-11-10 | End: 2017-11-10 | Stop reason: HOSPADM

## 2017-11-10 RX ORDER — PROPOFOL 10 MG/ML
VIAL (ML) INTRAVENOUS AS NEEDED
Status: DISCONTINUED | OUTPATIENT
Start: 2017-11-10 | End: 2017-11-10 | Stop reason: SURG

## 2017-11-10 RX ORDER — PROMETHAZINE HYDROCHLORIDE 25 MG/1
25 SUPPOSITORY RECTAL ONCE AS NEEDED
Status: DISCONTINUED | OUTPATIENT
Start: 2017-11-10 | End: 2017-11-10 | Stop reason: HOSPADM

## 2017-11-10 RX ORDER — DEXTROSE AND SODIUM CHLORIDE 5; .45 G/100ML; G/100ML
30 INJECTION, SOLUTION INTRAVENOUS CONTINUOUS PRN
Status: DISCONTINUED | OUTPATIENT
Start: 2017-11-10 | End: 2017-11-10 | Stop reason: HOSPADM

## 2017-11-10 RX ORDER — ONDANSETRON 2 MG/ML
4 INJECTION INTRAMUSCULAR; INTRAVENOUS ONCE AS NEEDED
Status: DISCONTINUED | OUTPATIENT
Start: 2017-11-10 | End: 2017-11-10 | Stop reason: HOSPADM

## 2017-11-10 RX ADMIN — LIDOCAINE HYDROCHLORIDE 50 MG: 10 INJECTION, SOLUTION INFILTRATION; PERINEURAL at 09:39

## 2017-11-10 RX ADMIN — PROPOFOL 30 MG: 10 INJECTION, EMULSION INTRAVENOUS at 09:44

## 2017-11-10 RX ADMIN — DEXTROSE AND SODIUM CHLORIDE 30 ML/HR: 5; 450 INJECTION, SOLUTION INTRAVENOUS at 09:16

## 2017-11-10 RX ADMIN — PROPOFOL 30 MG: 10 INJECTION, EMULSION INTRAVENOUS at 09:42

## 2017-11-10 RX ADMIN — PROPOFOL 50 MG: 10 INJECTION, EMULSION INTRAVENOUS at 09:39

## 2017-11-10 RX ADMIN — PROPOFOL 40 MG: 10 INJECTION, EMULSION INTRAVENOUS at 09:52

## 2017-11-10 NOTE — PLAN OF CARE
Problem: Patient Care Overview (Adult)  Goal: Plan of Care Review  Outcome: Outcome(s) achieved Date Met:  11/10/17    11/10/17 1019   Coping/Psychosocial Response Interventions   Plan Of Care Reviewed With patient   Patient Care Overview   Progress no change   Outcome Evaluation   Outcome Summary/Follow up Plan vss, pt alert         Problem: GI Endoscopy (Adult)  Goal: Signs and Symptoms of Listed Potential Problems Will be Absent or Manageable (GI Endoscopy)  Outcome: Outcome(s) achieved Date Met:  11/10/17    11/10/17 1019   GI Endoscopy   Problems Assessed (GI Endoscopy) all   Problems Present (GI Endoscopy) none

## 2017-11-10 NOTE — H&P
Progress Notes  Encounter Date: 11/10/2017  Renato Ugarte PA-C   Gastroenterology   Expand All Collapse All    []Hide copied text  []Hover for attribution information       Chief Complaint   Patient presents with   • Weight Loss       Ref. Dr. Cochran   • Heartburn         ENDO PROCEDURE ORDERED: EGD/COLON, bx, weight loss, diarrhea, change bm, reflux        Subjective        Reyna Mayfield is a 57 y.o. female. she is being seen for consultation today at the request of Nitin Cochran MD     History of Present Illness     This 57-year-old female who works at the [employer] was sent for consultation for weight loss and change in bowel habits by Dr. Cochran who saw the patient on 8/22/17.  She thinks she had a colonoscopy with possible colon polyp more than 5 years ago by Dr. Moyer.  Recent laboratory on 7/19/17 showed normal B12, CBC, TSH, vitamin D, CMP showed a protein 6.5, otherwise normal.  Patient currently denies abdominal pain, she states she previously had severe heartburn but now takes Maalox or Tums and does fairly well.  She states years ago she had had antireflux treatment and was having dysphagia.  She currently denies nausea, vomiting, she denies any pain with eating or drinking.  She was having some tenderness in the epigastric region.  She states she normally has loose stool since cholecystectomy.  Now she states she can't have a bowel movement, she gets severe pain with straining in the left lower quadrant, she feels like she will pass out.  Her stools will not drop.  Sometimes they are hard, sometimes she will have diarrhea.  She seen no blood in her stool.  No color changes, no mucus.  Patient states she has lost a lot of weight, she states she had gotten down to 112 pounds from a normal of 120 pounds.     Patient is a pack-a-day smoker for many years, strongly encouraged quit.  Denied alcohol, illicit substance use.  She's had a previous Nissen fundoplication, tubal ligation,  cholecystectomy, shoulder surgery.  Family history of diabetes, heart disease, gallstones, breast cancer and a sister, no known family history GI tract malignancy.  Father  age 94th diabetes.  Mother  age 80 with heart disease and diabetes.  Spouse in apparent good health,  since .  3 brothers, 2 sisters still living 1 sisters had breast cancer.  3 children in good health.     A/P: Patient is moderately tender in the mid-upper abdomen with previous history of GERD, change in bowel habits, significant weight loss, certainly would have concern for possible malignancy.  Recommend EGD/colonoscopy with biopsies to further evaluate.  She has a history of colon polyp possibly.  Recommend abdominal ultrasound to look at her liver and kidneys.  We'll check urinalysis with microscopy.  Would consider further imaging including her chest given her long-standing smoking history.  We'll see her in follow-up after the above, further pending clinical course and the results of the above.     Thank you very much Dr. Cochran for this consultation, and for allowing us to participate in the care of your patient.  We'll keep you informed.      The following portions of the patient's history were reviewed and updated as appropriate:    Medical History         Past Medical History:   Diagnosis Date   • Drug withdrawal     • MALISSA (generalized anxiety disorder)     • GERD (gastroesophageal reflux disease)     • History of colonic polyps     • Insomnia     • Major depressive disorder, recurrent, moderate     • Pain in pelvis       Discomfort more than pain      • Pelvic floor relaxation     • Tobacco abuse 2017          Surgical History          Past Surgical History:   Procedure Laterality Date   • COLONOSCOPY        yearly due to polyps   • ESOPHAGUS SURGERY       • LAPAROSCOPIC CHOLECYSTECTOMY         @ 23 y/o   • OTHER SURGICAL HISTORY   2014     Remove Impacted Cerumen 86681 (1)      • PAP SMEAR    2015     normal   • SHOULDER SURGERY Left    • TUBAL ABDOMINAL LIGATION                  Family History   Problem Relation Age of Onset   • Diabetes Mother     • Heart disease Mother     • Diabetes Father     • Osteoporosis Sister     • Colon cancer Neg Hx     • Stomach cancer Neg Hx        OB History      Para Term  AB Living     3 3 3          SAB TAB Ectopic Multiple Live Births                       No Known Allergies   Social History    Social History            Social History   • Marital status:        Spouse name: N/A   • Number of children: N/A   • Years of education: N/A            Social History Main Topics   • Smoking status: Current Every Day Smoker       Packs/day: 1.00   • Smokeless tobacco: Never Used   • Alcohol use No   • Drug use: No   • Sexual activity: No           Other Topics Concern   • None      Social History Narrative            Current Outpatient Prescriptions:   •  aspirin 81 MG tablet, Take 81 mg by mouth Daily., Disp: , Rfl:   •  Black Cohosh 540 MG capsule, Take 1 tablet by mouth Daily., Disp: , Rfl:   •  meclizine (ANTIVERT) 25 MG tablet, Take 1 tablet by mouth 3 (Three) Times a Day As Needed for dizziness., Disp: 60 tablet, Rfl: 2  •  Multiple Vitamins-Calcium (ONE-A-DAY WOMENS PO), Take  by mouth Daily., Disp: , Rfl:   •  ondansetron ODT (ZOFRAN-ODT) 4 MG disintegrating tablet, Take 4 mg by mouth Every 8 (Eight) Hours As Needed for Nausea., Disp: , Rfl: 0  •  varenicline (CHANTIX CONTINUING MONTH ISHA) 1 MG tablet, Take 0.5 tablets by mouth 2 (Two) Times a Day., Disp: 90 tablet, Rfl: 1  Review of Systems  Review of Systems   Constitutional: Positive for unexpected weight change.   HENT: Negative for tinnitus, trouble swallowing and voice change.    Cardiovascular: Negative for chest pain.   Gastrointestinal: Positive for abdominal pain, constipation, diarrhea and nausea. Negative for abdominal distention, anal bleeding, blood in stool, rectal pain  "and vomiting.   Genitourinary: Positive for frequency and urgency.   Musculoskeletal: Positive for back pain.   All other systems reviewed and are negative.                                 Objective        /68 (BP Location: Left arm)  Pulse 63  Ht 63\" (160 cm)  Wt 119 lb (54 kg)  LMP  (LMP Unknown)  BMI 21.08 kg/m2  Physical Exam   Constitutional: She is oriented to person, place, and time. She appears well-developed and well-nourished. No distress.   HENT:   Head: Normocephalic and atraumatic.   Eyes: EOM are normal. Pupils are equal, round, and reactive to light.   Neck: Normal range of motion.   Cardiovascular: Normal rate, regular rhythm and normal heart sounds.    Pulmonary/Chest: Effort normal and breath sounds normal.   Abdominal: Soft. Bowel sounds are normal. She exhibits no shifting dullness, no distension, no abdominal bruit, no ascites and no mass. There is no hepatosplenomegaly. There is tenderness. There is no rigidity, no rebound, no guarding and no CVA tenderness. No hernia. Hernia confirmed negative in the ventral area.   Mild diffuse   Musculoskeletal: Normal range of motion.   Neurological: She is alert and oriented to person, place, and time.   Skin: Skin is warm and dry.   Psychiatric: She has a normal mood and affect. Her behavior is normal. Judgment and thought content normal.   Nursing note and vitals reviewed.        Assessment/Plan           1. Weight loss, abnormal    2. Change in bowel habits    3. Gastroesophageal reflux disease, esophagitis presence not specified    4. Diarrhea, unspecified type    .   Reyna was seen today for weight loss and heartburn.     Diagnoses and all orders for this visit:     Weight loss, abnormal  -     Urine Culture  -     Urinalysis With Microscopic  -     US Abdomen Complete  -     Case Request; Standing  -     dextrose 5 % and sodium chloride 0.45 % infusion; Infuse 30 mL/hr into a venous catheter Continuous As Needed (Start Prior to " Procedure).  -     Case Request     Change in bowel habits  -     Urine Culture  -     Urinalysis With Microscopic  -     US Abdomen Complete  -     Case Request; Standing  -     dextrose 5 % and sodium chloride 0.45 % infusion; Infuse 30 mL/hr into a venous catheter Continuous As Needed (Start Prior to Procedure).  -     Case Request     Gastroesophageal reflux disease, esophagitis presence not specified  -     Urine Culture  -     Urinalysis With Microscopic  -     US Abdomen Complete  -     Case Request; Standing  -     dextrose 5 % and sodium chloride 0.45 % infusion; Infuse 30 mL/hr into a venous catheter Continuous As Needed (Start Prior to Procedure).  -     Case Request     Diarrhea, unspecified type  -     Urine Culture  -     Urinalysis With Microscopic  -     US Abdomen Complete  -     Case Request; Standing  -     dextrose 5 % and sodium chloride 0.45 % infusion; Infuse 30 mL/hr into a venous catheter Continuous As Needed (Start Prior to Procedure).  -     Case Request     Other orders  -     sodium-potassium-magnesium sulfates (SUPREP BOWEL PREP KIT) 17.5-3.13-1.6 GM/180ML solution oral solution; Take 1 bottle by mouth 1 (One) Time for 1 dose. Take as per instruction sheet for colonoscopy prep.  -     Obtain Informed Consent; Standing  -     POC Glucose Fingerstick; Standing           Orders placed during this encounter include:        Orders Placed This Encounter   Procedures   • Urine Culture   • US Abdomen Complete       Order Specific Question:   Reason for Exam:       Answer:   abd pain, wt loss   • Urinalysis With Microscopic         Medications prescribed:       New Medications Ordered This Visit   Medications   • sodium-potassium-magnesium sulfates (SUPREP BOWEL PREP KIT) 17.5-3.13-1.6 GM/180ML solution oral solution       Sig: Take 1 bottle by mouth 1 (One) Time for 1 dose. Take as per instruction sheet for colonoscopy prep.       Dispense:  1 bottle       Refill:  0      Discontinued  Medications        Reason for Discontinue     carbamide peroxide (DEBROX) 6.5 % otic solution Therapy completed         Requested Prescriptions             Signed Prescriptions Disp Refills   • sodium-potassium-magnesium sulfates (SUPREP BOWEL PREP KIT) 17.5-3.13-1.6 GM/180ML solution oral solution 1 bottle 0       Sig: Take 1 bottle by mouth 1 (One) Time for 1 dose. Take as per instruction sheet for colonoscopy prep.         Review and/or summary of lab tests, radiology, procedures, medications. Review and summary of old records and obtaining of history. The risks and benefits of my recommendations, as well as other treatment options were discussed with the patient today. Questions were answered.     Follow-up: Return if symptoms worsen or fail to improve, for After the above.     ESOPHAGOGASTRODUODENOSCOPY (u/s prior)  (N/A), COLONOSCOPY (N/A)         This document has been electronically signed by Power Harris MD on November 10, 2017 9:18 AM              Results for orders placed or performed in visit on 07/25/17   Liquid-based Pap Smear, Screening   Result Value Ref Range     Case Report           Gynecologic Cytology Report                       Case: [id number]                                  Authorizing Provider:  Nita Goyal              Collected:           07/25/2017 11:05 AM                                 JACINDA Solomon                                                         Ordering Location:     [facility]     Received:            07/25/2017 11:35 AM                                 [facility]                                                               First Screen:          Nyla Graff                                                              Rescreen:              Benjamin Gavin MD                                                         Specimen:    Liquid-Based Pap, Screening, Cervix                                                       Interpretation Negative  for intraepithelial lesion or malignancy        Other Findings Mild inflammation       Specimen Adequacy           Satisfactory for evaluation, endocervical/transformation zone component present     Additional Information           Disclaimer: Cervical cytology is a screening test primarily for squamous cancer and its precursors and has associated false-negative and false-positive results.  Technologies such as liquid-based preparations may decrease but will not eliminate all false-negative results.  Follow-up of unexplained clinical signs and symptoms is recommended to minimize false-negative results. (The [place] System for Reporting Cervical Cytology: [employer], 2015).     Embedded Images       Results for orders placed or performed in visit on 07/19/17   CBC Auto Differential   Result Value Ref Range     WBC 6.99 3.20 - 9.80 10*3/mm3     RBC 4.73 3.77 - 5.16 10*6/mm3     Hemoglobin 14.1 12.0 - 15.5 g/dL     Hematocrit 42.3 35.0 - 45.0 %     MCV 89.4 80.0 - 98.0 fL     MCH 29.8 26.5 - 34.0 pg     MCHC 33.3 31.4 - 36.0 g/dL     RDW 14.0 11.5 - 14.5 %     RDW-SD 45.1 36.4 - 46.3 fl     MPV 10.2 8.0 - 12.0 fL     Platelets 162 150 - 450 10*3/mm3     Neutrophil % 49.1 37.0 - 80.0 %     Lymphocyte % 42.5 10.0 - 50.0 %     Monocyte % 5.7 0.0 - 12.0 %     Eosinophil % 2.3 0.0 - 7.0 %     Basophil % 0.4 0.0 - 2.0 %     Neutrophils, Absolute 3.43 2.00 - 8.60 10*3/mm3     Lymphocytes, Absolute 2.97 0.60 - 4.20 10*3/mm3     Monocytes, Absolute 0.40 0.00 - 0.90 10*3/mm3     Eosinophils, Absolute 0.16 0.00 - 0.70 10*3/mm3     Basophils, Absolute 0.03 0.00 - 0.20 10*3/mm3   Vitamin D 25 Hydroxy   Result Value Ref Range     25 Hydroxy, Vitamin D 36.6 30.0 - 100.0 ng/ml   TSH   Result Value Ref Range     TSH 1.710 0.460 - 4.680 mIU/mL   Vitamin B12   Result Value Ref Range     Vitamin B-12 618 239 - 931 pg/mL   Comprehensive Metabolic Panel   Result Value Ref Range     Glucose 98 70 - 100 mg/dL     BUN 11 8 - 25 mg/dL      Creatinine 0.60 0.40 - 1.30 mg/dL     Sodium 141 134 - 146 mmol/L     Potassium 4.2 3.4 - 5.4 mmol/L     Chloride 106 100 - 112 mmol/L     CO2 28.0 20.0 - 32.0 mmol/L     Calcium 8.6 8.4 - 10.8 mg/dL     Total Protein 6.5 (L) 6.7 - 8.2 g/dL     Albumin 3.70 3.20 - 5.50 g/dL     ALT (SGPT) 16 10 - 60 U/L     AST (SGOT) 19 10 - 60 U/L     Alkaline Phosphatase 61 15 - 121 U/L     Total Bilirubin 0.3 0.2 - 1.0 mg/dL     eGFR Non  Amer 104 51 - 120 mL/min/1.73     Globulin 2.8 2.5 - 4.6 gm/dL     A/G Ratio 1.3 1.0 - 3.0 g/dL     BUN/Creatinine Ratio 18.3 7.0 - 25.0     Anion Gap 7.0 5.0 - 15.0 mmol/L   Results for orders placed or performed in visit on 02/22/17    PAP SMEAR   Result Value Ref Range      Pap smear completed      MAMMOGRAPHY   Result Value Ref Range      Mammogram completed     Results for orders placed or performed during the hospital encounter of 02/03/16   Estrogens, fractionated   Result Value Ref Range     Estrone 39 pg/mL     Estradiol <5.0 pg/mL   Progesterone   Result Value Ref Range     Progesterone <0.1 ng/mL   TSH   Result Value Ref Range     TSH 3.45 0.46 - 4.68 uIU/ml   T4, free   Result Value Ref Range     Free T4 1.42 0.78 - 2.19 ng/dl   Results for orders placed or performed during the hospital encounter of 02/03/16   Converted (Historical) Gyn Cytology   Result Value Ref Range     Spec Descr 1 SPECIMEN(S): Cervical/Endocervical       Clinical Information           CLINICAL HISTORY: Reason for Pap Smear: Routine Smear, LMP: 9 YEARS AGO     ICD9 Diagnosis Code 1 ICD-9: Z01.419     HPV DNA probe, direct   Result Value Ref Range     HPV Aptima Negative Negative   Results for orders placed or performed in visit on 04/23/15   Rapid drug screen, urine   Result Value Ref Range     Barbiturates Screen, Urine Negative NEGATIVE     Benzodiazepine Screen, Urine Negative NEGATIVE     Opiate Screen, Urine Negative NEGATIVE     THC Screen Interpretation Negative NEGATIVE      Amphet/Methamphet, Screen, Urine POSITIVE (A) NEGATIVE     Cocaine Screen, Urine Negative NEGATIVE     Oxycodone Screen, Urine Negative NEGATIVE     Methadone Screen, Urine Negative NEGATIVE   Results for orders placed or performed in visit on 02/24/15   H. pylori antibody, IgG   Result Value Ref Range     H pylori Abs (Qual) Negative     Results for orders placed or performed in visit on 02/04/15   Rapid drug screen, urine   Result Value Ref Range     Barbiturates Screen, Urine Negative NEGATIVE     Benzodiazepine Screen, Urine Negative NEGATIVE     Opiate Screen, Urine Negative NEGATIVE     THC Screen Interpretation Negative NEGATIVE     Amphet/Methamphet, Screen, Urine Negative NEGATIVE     Cocaine Screen, Urine Negative NEGATIVE     Oxycodone Screen, Urine Negative NEGATIVE     Methadone Screen, Urine Negative NEGATIVE   CBC and Differential   Result Value Ref Range     WBC 6.6 3.2 - 9.8 x1000/uL     RBC 4.71 3.77 - 5.16 alli/mm3     Hemoglobin 14.2 12.0 - 15.5 gm/dl     Hematocrit 43.1 35.0 - 45.0 %     MCV 91.5 80.0 - 98.0 fl     MCH 30.1 26.0 - 34.0 pg     MCHC 32.9 31.4 - 36.0 gm/dl     Platelets 176 150 - 450 x1000/mm3     RDW 13.9 11.5 - 14.5 %     MPV 11.1 8.0 - 12.0 fl     Neutrophil Rel % 49.5 37.0 - 80.0 %     Lymphocyte Rel % 41.6 10.0 - 50.0 %     Monocyte Rel % 5.7 0.0 - 12.0 %     Eosinophil Rel % 2.7 0.0 - 7.0 %     Basophil Rel % 0.5 0.0 - 2.0 %     nRBC 0       nRBC 0     TSH   Result Value Ref Range     TSH 2.23 0.46 - 4.68 uIU/ml   Lipid panel   Result Value Ref Range     Total Cholesterol 224 (H) 150 - 200 mg/dl     Triglycerides 96 35 - 160 mg/dl     HDL Cholesterol 51.0 35.0 - 100.0 mg/dl     LDL Cholesterol  154 mg/dl   Comprehensive metabolic panel   Result Value Ref Range     Glucose 88 70.0 - 100.0 mg/dl     BUN 10 8.0 - 25.0 mg/dl     Creatinine 0.6 0.4 - 1.3 mg/dl     Sodium 139.0 134 - 146 mmol/L     Potassium 4.0 3.4 - 5.4 mmol/L     Chloride 107.0 100.0 - 112.0 mmol/L     CO2 30.0  20.0 - 32.0 mmol/L     Calcium 8.8 8.4 - 10.8 mg/dl     Total Protein 6.5 (L) 6.7 - 8.2 gm/dl     Albumin 3.7 3.2 - 5.5 gm/dl     Total Bilirubin 0.7 0.2 - 1.0 mg/dl     Alkaline Phosphatase 64 15 - 121 U/L     ALT (SGPT) 16 10 - 60 U/L     AST (SGOT) 18 10 - 60 U/L     GFR MDRD Non  105 51 - 120 mL/min/1.73 sq.M     GFR MDRD  127 (H) 51 - 120 mL/min/1.73 sq.M     Anion Gap 2.0 (L) 5.0 - 15.0 mmol/L      *Note: Due to a large number of results and/or encounters for the requested time period, some results have not been displayed. A complete set of results can be found in Results Review.         Some portions of this note have been dictated using voice recognition software and may contain errors and/or omissions.            Office Visit on 9/19/2017              Detailed Report

## 2017-11-10 NOTE — PLAN OF CARE
Problem: Patient Care Overview (Adult)  Goal: Plan of Care Review  Outcome: Ongoing (interventions implemented as appropriate)    11/10/17 1001   Coping/Psychosocial Response Interventions   Plan Of Care Reviewed With patient   Patient Care Overview   Progress no change         Problem: GI Endoscopy (Adult)  Goal: Signs and Symptoms of Listed Potential Problems Will be Absent or Manageable (GI Endoscopy)  Outcome: Ongoing (interventions implemented as appropriate)    11/10/17 1001   GI Endoscopy   Problems Assessed (GI Endoscopy) all   Problems Present (GI Endoscopy) none

## 2017-11-10 NOTE — ANESTHESIA POSTPROCEDURE EVALUATION
Patient: Reyna Mayfield    Procedure Summary     Date Anesthesia Start Anesthesia Stop Room / Location    11/10/17 0938 0958 [facility] ENDOSCOPY 1 / [facility] ENDOSCOPY       Procedure Diagnosis Surgeon Provider    ESOPHAGOGASTRODUODENOSCOPY (u/s prior @1045)  (N/A Esophagus); COLONOSCOPY (N/A ) Change in bowel habits; Weight loss, abnormal; Gastroesophageal reflux disease, esophagitis presence not specified; Diarrhea, unspecified type  (Change in bowel habits [R19.4]; Weight loss, abnormal [R63.4]; Gastroesophageal reflux disease, esophagitis presence not specified [K21.9]; Diarrhea, unspecified type [R19.7]) MD Jaswinder Sanchez CRNA          Anesthesia Type: MAC  Last vitals  BP   113/70 (11/10/17 0911)   Temp   98.4 °F (36.9 °C) (11/10/17 0911)   Pulse   76 (11/10/17 0911)   Resp         SpO2         Post Anesthesia Care and Evaluation    Patient location during evaluation: bedside  Patient participation: complete - patient participated  Level of consciousness: awake and alert  Pain management: adequate  Airway patency: patent  Anesthetic complications: No anesthetic complications    Cardiovascular status: acceptable  Respiratory status: acceptable  Hydration status: acceptable

## 2017-11-10 NOTE — ANESTHESIA PREPROCEDURE EVALUATION
Anesthesia Evaluation     Patient summary reviewed and Nursing notes reviewed   no history of anesthetic complications:  NPO Solid Status: > 8 hours  NPO Liquid Status: > 8 hours     Airway   Mallampati: II  TM distance: >3 FB  Neck ROM: full  no difficulty expected  Dental - normal exam   (+) upper dentures and lower dentures    Pulmonary - normal exam    breath sounds clear to auscultation  (+) a smoker Current,   (-) shortness of breath  Cardiovascular - negative cardio ROS and normal exam  Exercise tolerance: good (4-7 METS)    Rhythm: regular  Rate: normal    (-) CAD, angina, orthopnea, CRESPO      Neuro/Psych- negative ROS  (-) CVA  GI/Hepatic/Renal/Endo    (+)  GERD,     Musculoskeletal (-) negative ROS    Abdominal  - normal exam   Substance History - negative use     OB/GYN negative ob/gyn ROS         Other - negative ROS                                       Anesthesia Plan    ASA 2     MAC     intravenous induction   Anesthetic plan and risks discussed with patient.

## 2017-11-13 LAB
LAB AP CASE REPORT: NORMAL
Lab: NORMAL
PATH REPORT.FINAL DX SPEC: NORMAL
PATH REPORT.GROSS SPEC: NORMAL

## 2017-11-14 ENCOUNTER — OFFICE VISIT (OUTPATIENT)
Dept: GASTROENTEROLOGY | Facility: CLINIC | Age: 57
End: 2017-11-14

## 2017-11-14 VITALS
BODY MASS INDEX: 21.62 KG/M2 | HEART RATE: 72 BPM | WEIGHT: 122 LBS | DIASTOLIC BLOOD PRESSURE: 62 MMHG | HEIGHT: 63 IN | SYSTOLIC BLOOD PRESSURE: 102 MMHG

## 2017-11-14 DIAGNOSIS — K63.5 POLYP OF COLON, UNSPECIFIED PART OF COLON, UNSPECIFIED TYPE: ICD-10-CM

## 2017-11-14 DIAGNOSIS — R19.7 DIARRHEA, UNSPECIFIED TYPE: Primary | ICD-10-CM

## 2017-11-14 DIAGNOSIS — K57.90 DIVERTICULOSIS OF INTESTINE WITHOUT BLEEDING, UNSPECIFIED INTESTINAL TRACT LOCATION: ICD-10-CM

## 2017-11-14 DIAGNOSIS — R63.4 WEIGHT LOSS, ABNORMAL: ICD-10-CM

## 2017-11-14 DIAGNOSIS — K21.00 GASTROESOPHAGEAL REFLUX DISEASE WITH ESOPHAGITIS: ICD-10-CM

## 2017-11-14 PROCEDURE — 99214 OFFICE O/P EST MOD 30 MIN: CPT | Performed by: PHYSICIAN ASSISTANT

## 2017-11-14 RX ORDER — ESOMEPRAZOLE MAGNESIUM 20 MG/1
1 TABLET, DELAYED RELEASE ORAL DAILY
Qty: 30 TABLET | Refills: 5 | Status: SHIPPED | OUTPATIENT
Start: 2017-11-14 | End: 2018-02-28

## 2017-11-16 ENCOUNTER — TELEPHONE (OUTPATIENT)
Dept: GASTROENTEROLOGY | Facility: CLINIC | Age: 57
End: 2017-11-16

## 2017-11-16 NOTE — TELEPHONE ENCOUNTER
Patient Rx for Esomeprazole 20mg required prior authorization.  Authorization is obtained and is valid 10/17/17- 12/13/2099. [facility] is notified at [phone].  Patient is notified at [phone] med is ready for .

## 2018-02-28 ENCOUNTER — OFFICE VISIT (OUTPATIENT)
Dept: FAMILY MEDICINE CLINIC | Facility: CLINIC | Age: 58
End: 2018-02-28

## 2018-02-28 VITALS
BODY MASS INDEX: 21.02 KG/M2 | HEIGHT: 63 IN | WEIGHT: 118.6 LBS | DIASTOLIC BLOOD PRESSURE: 60 MMHG | OXYGEN SATURATION: 98 % | SYSTOLIC BLOOD PRESSURE: 100 MMHG | TEMPERATURE: 98.1 F | HEART RATE: 89 BPM

## 2018-02-28 DIAGNOSIS — G47.9 SLEEP DISORDER: Chronic | ICD-10-CM

## 2018-02-28 DIAGNOSIS — K21.00 GASTROESOPHAGEAL REFLUX DISEASE WITH ESOPHAGITIS: Chronic | ICD-10-CM

## 2018-02-28 DIAGNOSIS — F33.1 MAJOR DEPRESSIVE DISORDER, RECURRENT, MODERATE (HCC): Primary | Chronic | ICD-10-CM

## 2018-02-28 DIAGNOSIS — Z72.0 TOBACCO ABUSE: Chronic | ICD-10-CM

## 2018-02-28 DIAGNOSIS — F41.1 GAD (GENERALIZED ANXIETY DISORDER): Chronic | ICD-10-CM

## 2018-02-28 PROCEDURE — 99214 OFFICE O/P EST MOD 30 MIN: CPT | Performed by: INTERNAL MEDICINE

## 2018-02-28 RX ORDER — AMOXICILLIN 500 MG/1
500 CAPSULE ORAL 3 TIMES DAILY
COMMUNITY
End: 2018-03-20

## 2018-02-28 RX ORDER — TRAZODONE HYDROCHLORIDE 150 MG/1
TABLET ORAL
Qty: 30 TABLET | Refills: 11 | Status: SHIPPED | OUTPATIENT
Start: 2018-02-28 | End: 2019-08-30

## 2018-02-28 RX ORDER — ESCITALOPRAM OXALATE 10 MG/1
10 TABLET ORAL DAILY
Qty: 30 TABLET | Refills: 11 | Status: SHIPPED | OUTPATIENT
Start: 2018-02-28 | End: 2018-04-24 | Stop reason: SDUPTHER

## 2018-02-28 RX ORDER — PANTOPRAZOLE SODIUM 40 MG/1
40 TABLET, DELAYED RELEASE ORAL DAILY
Qty: 30 TABLET | Refills: 11 | Status: SHIPPED | OUTPATIENT
Start: 2018-02-28 | End: 2018-04-24 | Stop reason: SDUPTHER

## 2018-02-28 NOTE — PROGRESS NOTES
Subjective        History of Present Illness     Reyna Mayfield is a 57 y.o. female with chronic conditions including GERD, MALISSA, major depressive disorder, and history of colon polyps.  Her  is a retired  and works at [employer].  She is a  at [employer] in [place]. who established care last July.  She had an EGD last summer by Dr. Masters revealing evidence of esophagitis. Colonoscopy revealed a rectal polyp with internal and external hemorrhoids.   He prescribed Nexium, but this was not covered on her insurance formulary.  She did not follow up with Dr. Masters's office regarding this.  She has been taking no proton pump inhibitors or other medications to reduce gastric acid production.  She continues to have intermittent episodes of diarrhea.  Her GERD symptoms have been minimally improved with Mylanta or Tums.      She presents today with complaints of increased anxiety and depression, which has been overwhelming at times.  She is tearful today reporting depression at times, but anxiety seems to be worse. She has tried SSRI therapy in the past, but had tolerability issues, although, she is unsure regarding specific medications and side effects.  She thinks she took Paxil in the past, but doesn't remember why she was intolerant.  She was on Wellbutrin in the past without tolerabilty issues.  She is not sleeping well at night despite taking melatonin.  She can fall asleep, but wakes several times nightly.  She attributes this to drinking a lot or water in the evening.  She lives with her , but reports they live separate lives and she basically isolates herself in the home.  She pushes herself to go to work and then comes home and stays in her room.  Her co-workers tell her she has been agitated at times, which she reports is not her normal.  She denies suicidal ideations, but does report feeling apathetic.  She has a cruise planned with her family in June and has not  "been excited about it.  She denies manic episodes.    She has never been evaluated by psychiatry.   She denies family history of bipolar disorder or schizoprhenia.  She denies SI or SA.  She reports that she would never harm herself.    She continues to smoke cigarettes, but has decreased her smoking to 1/2 pack of cigarettes daily.  I advised the patient of the risks in continuing to use tobacco products.  Patient is urged to stop smoking and never start again.      Weight is stable.  Blood pressure is at goal.       Review of Systems   Constitutional: Negative for chills, fatigue and fever.   HENT: Negative for congestion, ear pain, postnasal drip, sinus pressure and sore throat.    Respiratory: Negative for cough, shortness of breath and wheezing.    Cardiovascular: Negative for chest pain, palpitations and leg swelling.   Gastrointestinal: Negative for abdominal pain, blood in stool, constipation, diarrhea, nausea and vomiting.   Endocrine: Negative for cold intolerance, heat intolerance, polydipsia and polyuria.   Genitourinary: Negative for dysuria, frequency, hematuria and urgency.   Skin: Negative for rash.   Neurological: Negative for syncope and weakness.   Psychiatric/Behavioral: The patient is nervous/anxious.         Objective     Vitals:    02/28/18 1445   BP: 100/60   Pulse: 89   Temp: 98.1 °F (36.7 °C)   TempSrc: Oral   SpO2: 98%   Weight: 53.8 kg (118 lb 9.6 oz)   Height: 160 cm (63\")     Physical Exam   Constitutional: She is oriented to person, place, and time. She appears well-developed and well-nourished. No distress.   Very thin female.  Anxious, tearful at times today.     HENT:   Head: Normocephalic and atraumatic.   Nose: Nose normal.   Mouth/Throat: Oropharynx is clear and moist. No oropharyngeal exudate.   Benign posterior smoker's changes noted.    Eyes: EOM are normal. Pupils are equal, round, and reactive to light.   Neck: Neck supple. No JVD present. No thyromegaly present. "   Cardiovascular: Normal rate, regular rhythm and normal heart sounds.    Pulmonary/Chest: Effort normal and breath sounds normal. No accessory muscle usage. No respiratory distress. She has no wheezes. She has no rales.   Chronic lung sounds.    Abdominal: Soft. Bowel sounds are normal. She exhibits no distension. There is no tenderness.   Epigastric tenderness.     Musculoskeletal: She exhibits no edema.   Lymphadenopathy:     She has no cervical adenopathy.   Neurological: She is alert and oriented to person, place, and time. No cranial nerve deficit.   Psychiatric: She has a normal mood and affect. Her speech is normal and behavior is normal. Judgment and thought content normal.       Future Appointments  Date Time Provider Department Center   3/20/2018 1:30 PM Nitin Cochran MD [facility] None   7/23/2018 9:00 AM Nitin Cochran MD [facility] None       Assessment/Plan      Start Trazodone 150 mg 1/2-1 tablet each evening to help with sleep.    Start Lexapro 10 mg taking 1/2 pill the first 2-4 days progressing to a whole tablet daily.  Notify me of any adverse reaction to the medications.   Return for follow up in three weeks.      Start Protonix 40 mg every morning.  She is convinced that Prilosec is not effective, although I do not know what dose Prilosec she took previously.  The GI service prescribed Nexium, but it is not covered on her formulary.  Avoid NSAIDs.  Pursue nonpharmacological reflux measures.    I advised the patient of the risks in continuing to use tobacco products.  Patient is urged to stop smoking and never start again.       Return to clinic in 2-3 weeks to reevaluate.    Scribed for Dr. Cochran by Mihaela Calderón [facility].     Diagnoses and all orders for this visit:    Major depressive disorder, recurrent, moderate    MALISSA (generalized anxiety disorder)    Gastroesophageal reflux disease with esophagitis    Sleep disorder    Tobacco abuse    Other orders  -     amoxicillin (AMOXIL) 500 MG  capsule; Take 500 mg by mouth 3 (Three) Times a Day.  -     escitalopram (LEXAPRO) 10 MG tablet; Take 1 tablet by mouth Daily.  -     traZODone (DESYREL) 150 MG tablet; 1/2-1 tablet qhs for sleep  -     pantoprazole (PROTONIX) 40 MG EC tablet; Take 1 tablet by mouth Daily.        No visits with results within 3 Week(s) from this visit.  Latest known visit with results is:    Admission on 11/10/2017, Discharged on 11/10/2017   Component Date Value Ref Range Status   • Case Report 11/10/2017    Final                    Value:Surgical Pathology Report                         Case: [id number]                                  Authorizing Provider:  Power Harris MD         Collected:           11/10/2017 09:45 AM          Ordering Location:     [facility]             Received:            11/10/2017 11:31 AM                                 [place] [facility]                                                     Pathologist:           Garrett Hinton MD                                                          Specimens:   1) - Gastric, Antrum                                                                                2) - Esophagus, Distal                                                                              3) - Large Intestine, colonic mucosa                                                                4) - Large Intestine, Rectum, POLYP                                                       • Final Diagnosis 11/10/2017    Final                    Value:This result contains rich text formatting which cannot be displayed here.   • Gross Description 11/10/2017    Final                    Value:This result contains rich text formatting which cannot be displayed here.   ]

## 2018-03-20 ENCOUNTER — OFFICE VISIT (OUTPATIENT)
Dept: FAMILY MEDICINE CLINIC | Facility: CLINIC | Age: 58
End: 2018-03-20

## 2018-03-20 VITALS
DIASTOLIC BLOOD PRESSURE: 60 MMHG | WEIGHT: 121.2 LBS | TEMPERATURE: 98.4 F | BODY MASS INDEX: 21.48 KG/M2 | HEIGHT: 63 IN | SYSTOLIC BLOOD PRESSURE: 104 MMHG | HEART RATE: 72 BPM

## 2018-03-20 DIAGNOSIS — F41.1 GAD (GENERALIZED ANXIETY DISORDER): Primary | Chronic | ICD-10-CM

## 2018-03-20 DIAGNOSIS — G44.209 TENSION HEADACHE: ICD-10-CM

## 2018-03-20 DIAGNOSIS — Z72.0 TOBACCO ABUSE: Chronic | ICD-10-CM

## 2018-03-20 DIAGNOSIS — G47.9 SLEEP DISORDER: Chronic | ICD-10-CM

## 2018-03-20 DIAGNOSIS — F33.1 MAJOR DEPRESSIVE DISORDER, RECURRENT, MODERATE (HCC): Chronic | ICD-10-CM

## 2018-03-20 DIAGNOSIS — B00.1 HERPES LABIALIS WITHOUT COMPLICATION: ICD-10-CM

## 2018-03-20 DIAGNOSIS — K21.00 GASTROESOPHAGEAL REFLUX DISEASE WITH ESOPHAGITIS: Chronic | ICD-10-CM

## 2018-03-20 PROCEDURE — 99214 OFFICE O/P EST MOD 30 MIN: CPT | Performed by: INTERNAL MEDICINE

## 2018-03-20 RX ORDER — SCOLOPAMINE TRANSDERMAL SYSTEM 1 MG/1
1 PATCH, EXTENDED RELEASE TRANSDERMAL
Qty: 4 PATCH | Refills: 0 | Status: SHIPPED | OUTPATIENT
Start: 2018-03-20 | End: 2018-08-01

## 2018-03-20 RX ORDER — MECLIZINE HYDROCHLORIDE 25 MG/1
25 TABLET ORAL 3 TIMES DAILY PRN
Qty: 60 TABLET | Refills: 2 | Status: SHIPPED | OUTPATIENT
Start: 2018-03-20 | End: 2022-11-01

## 2018-03-20 RX ORDER — ACYCLOVIR 50 MG/G
OINTMENT TOPICAL
Qty: 1 EACH | Refills: 1 | Status: SHIPPED | OUTPATIENT
Start: 2018-03-20 | End: 2018-08-01

## 2018-03-20 RX ORDER — BUSPIRONE HYDROCHLORIDE 10 MG/1
10 TABLET ORAL 2 TIMES DAILY
Qty: 60 TABLET | Refills: 5 | Status: SHIPPED | OUTPATIENT
Start: 2018-03-20 | End: 2019-08-30
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
## 2018-03-20 NOTE — PATIENT INSTRUCTIONS
Steps to Quit Smoking  Smoking tobacco can be harmful to your health and can affect almost every organ in your body. Smoking puts you, and those around you, at risk for developing many serious chronic diseases. Quitting smoking is difficult, but it is one of the best things that you can do for your health. It is never too late to quit.  What are the benefits of quitting smoking?  When you quit smoking, you lower your risk of developing serious diseases and conditions, such as:  · Lung cancer or lung disease, such as COPD.  · Heart disease.  · Stroke.  · Heart attack.  · Infertility.  · Osteoporosis and bone fractures.  Additionally, symptoms such as coughing, wheezing, and shortness of breath may get better when you quit. You may also find that you get sick less often because your body is stronger at fighting off colds and infections. If you are pregnant, quitting smoking can help to reduce your chances of having a baby of low birth weight.  How do I get ready to quit?  When you decide to quit smoking, create a plan to make sure that you are successful. Before you quit:  · Pick a date to quit. Set a date within the next two weeks to give you time to prepare.  · Write down the reasons why you are quitting. Keep this list in places where you will see it often, such as on your bathroom mirror or in your car or wallet.  · Identify the people, places, things, and activities that make you want to smoke (triggers) and avoid them. Make sure to take these actions:  ¨ Throw away all cigarettes at home, at work, and in your car.  ¨ Throw away smoking accessories, such as ashtrays and lighters.  ¨ Clean your car and make sure to empty the ashtray.  ¨ Clean your home, including curtains and carpets.  · Tell your family, friends, and coworkers that you are quitting. Support from your loved ones can make quitting easier.  · Talk with your health care provider about your options for quitting smoking.  · Find out what treatment  options are covered by your health insurance.  What strategies can I use to quit smoking?  Talk with your healthcare provider about different strategies to quit smoking. Some strategies include:  · Quitting smoking altogether instead of gradually lessening how much you smoke over a period of time. Research shows that quitting “cold turkey” is more successful than gradually quitting.  · Attending in-person counseling to help you build problem-solving skills. You are more likely to have success in quitting if you attend several counseling sessions. Even short sessions of 10 minutes can be effective.  · Finding resources and support systems that can help you to quit smoking and remain smoke-free after you quit. These resources are most helpful when you use them often. They can include:  ¨ Online chats with a counselor.  ¨ Telephone quitlines.  ¨ Printed self-help materials.  ¨ Support groups or group counseling.  ¨ Text messaging programs.  ¨ Mobile phone applications.  · Taking medicines to help you quit smoking. (If you are pregnant or breastfeeding, talk with your health care provider first.) Some medicines contain nicotine and some do not. Both types of medicines help with cravings, but the medicines that include nicotine help to relieve withdrawal symptoms. Your health care provider may recommend:  ¨ Nicotine patches, gum, or lozenges.  ¨ Nicotine inhalers or sprays.  ¨ Non-nicotine medicine that is taken by mouth.  Talk with your health care provider about combining strategies, such as taking medicines while you are also receiving in-person counseling. Using these two strategies together makes you more likely to succeed in quitting than if you used either strategy on its own.  If you are pregnant or breastfeeding, talk with your health care provider about finding counseling or other support strategies to quit smoking. Do not take medicine to help you quit smoking unless told to do so by your health care  provider.  What things can I do to make it easier to quit?  Quitting smoking might feel overwhelming at first, but there is a lot that you can do to make it easier. Take these important actions:  · Reach out to your family and friends and ask that they support and encourage you during this time. Call telephone quitlines, reach out to support groups, or work with a counselor for support.  · Ask people who smoke to avoid smoking around you.  · Avoid places that trigger you to smoke, such as bars, parties, or smoke-break areas at work.  · Spend time around people who do not smoke.  · Lessen stress in your life, because stress can be a smoking trigger for some people. To lessen stress, try:  ¨ Exercising regularly.  ¨ Deep-breathing exercises.  ¨ Yoga.  ¨ Meditating.  ¨ Performing a body scan. This involves closing your eyes, scanning your body from head to toe, and noticing which parts of your body are particularly tense. Purposefully relax the muscles in those areas.  · Download or purchase mobile phone or tablet apps (applications) that can help you stick to your quit plan by providing reminders, tips, and encouragement. There are many free apps, such as QuitGuide from the CDC (Centers for Disease Control and Prevention). You can find other support for quitting smoking (smoking cessation) through smokefree.gov and other websites.  How will I feel when I quit smoking?  Within the first 24 hours of quitting smoking, you may start to feel some withdrawal symptoms. These symptoms are usually most noticeable 2-3 days after quitting, but they usually do not last beyond 2-3 weeks. Changes or symptoms that you might experience include:  · Mood swings.  · Restlessness, anxiety, or irritation.  · Difficulty concentrating.  · Dizziness.  · Strong cravings for sugary foods in addition to nicotine.  · Mild weight gain.  · Constipation.  · Nausea.  · Coughing or a sore throat.  · Changes in how your medicines work in your  body.  · A depressed mood.  · Difficulty sleeping (insomnia).  After the first 2-3 weeks of quitting, you may start to notice more positive results, such as:  · Improved sense of smell and taste.  · Decreased coughing and sore throat.  · Slower heart rate.  · Lower blood pressure.  · Clearer skin.  · The ability to breathe more easily.  · Fewer sick days.  Quitting smoking is very challenging for most people. Do not get discouraged if you are not successful the first time. Some people need to make many attempts to quit before they achieve long-term success. Do your best to stick to your quit plan, and talk with your health care provider if you have any questions or concerns.  This information is not intended to replace advice given to you by your health care provider. Make sure you discuss any questions you have with your health care provider.  Document Released: 12/12/2002 Document Revised: 08/15/2017 Document Reviewed: 05/03/2016  [employer] Patient Education © 2017 [employer]

## 2018-04-24 RX ORDER — ESCITALOPRAM OXALATE 10 MG/1
10 TABLET ORAL DAILY
Qty: 90 TABLET | Refills: 3 | Status: SHIPPED | OUTPATIENT
Start: 2018-04-24 | End: 2019-05-01 | Stop reason: SDUPTHER

## 2018-04-24 RX ORDER — PANTOPRAZOLE SODIUM 40 MG/1
40 TABLET, DELAYED RELEASE ORAL DAILY
Qty: 90 TABLET | Refills: 3 | Status: SHIPPED | OUTPATIENT
Start: 2018-04-24 | End: 2019-05-01 | Stop reason: SDUPTHER

## 2018-07-26 ENCOUNTER — LAB (OUTPATIENT)
Dept: LAB | Facility: OTHER | Age: 58
End: 2018-07-26

## 2018-07-26 DIAGNOSIS — K21.00 GASTROESOPHAGEAL REFLUX DISEASE WITH ESOPHAGITIS: Chronic | ICD-10-CM

## 2018-07-26 DIAGNOSIS — R63.4 WEIGHT LOSS, UNINTENTIONAL: Chronic | ICD-10-CM

## 2018-07-26 LAB
ALBUMIN SERPL-MCNC: 4.3 G/DL (ref 3.5–5)
ALBUMIN/GLOB SERPL: 1.6 G/DL (ref 1.1–1.8)
ALP SERPL-CCNC: 67 U/L (ref 38–126)
ALT SERPL W P-5'-P-CCNC: 27 U/L
ANION GAP SERPL CALCULATED.3IONS-SCNC: 7 MMOL/L (ref 5–15)
AST SERPL-CCNC: 28 U/L (ref 14–36)
BASOPHILS # BLD AUTO: 0.03 10*3/MM3 (ref 0–0.2)
BASOPHILS NFR BLD AUTO: 0.5 % (ref 0–2)
BILIRUB SERPL-MCNC: 0.4 MG/DL (ref 0.2–1.3)
BUN BLD-MCNC: 14 MG/DL (ref 7–17)
BUN/CREAT SERPL: 24.1 (ref 7–25)
CALCIUM SPEC-SCNC: 9.2 MG/DL (ref 8.4–10.2)
CHLORIDE SERPL-SCNC: 105 MMOL/L (ref 98–107)
CO2 SERPL-SCNC: 30 MMOL/L (ref 22–30)
CREAT BLD-MCNC: 0.58 MG/DL (ref 0.52–1.04)
DEPRECATED RDW RBC AUTO: 47 FL (ref 36.4–46.3)
EOSINOPHIL # BLD AUTO: 0.17 10*3/MM3 (ref 0–0.7)
EOSINOPHIL NFR BLD AUTO: 3.1 % (ref 0–7)
ERYTHROCYTE [DISTWIDTH] IN BLOOD BY AUTOMATED COUNT: 14.5 % (ref 11.5–14.5)
GFR SERPL CREATININE-BSD FRML MDRD: 108 ML/MIN/1.73 (ref 51–120)
GLOBULIN UR ELPH-MCNC: 2.7 GM/DL (ref 2.3–3.5)
GLUCOSE BLD-MCNC: 96 MG/DL (ref 74–99)
HCT VFR BLD AUTO: 46 % (ref 35–45)
HGB BLD-MCNC: 14.7 G/DL (ref 12–15.5)
LYMPHOCYTES # BLD AUTO: 2.46 10*3/MM3 (ref 0.6–4.2)
LYMPHOCYTES NFR BLD AUTO: 44.2 % (ref 10–50)
MCH RBC QN AUTO: 29.1 PG (ref 26.5–34)
MCHC RBC AUTO-ENTMCNC: 32 G/DL (ref 31.4–36)
MCV RBC AUTO: 91.1 FL (ref 80–98)
MONOCYTES # BLD AUTO: 0.31 10*3/MM3 (ref 0–0.9)
MONOCYTES NFR BLD AUTO: 5.6 % (ref 0–12)
NEUTROPHILS # BLD AUTO: 2.59 10*3/MM3 (ref 2–8.6)
NEUTROPHILS NFR BLD AUTO: 46.6 % (ref 37–80)
PLATELET # BLD AUTO: 167 10*3/MM3 (ref 150–450)
PMV BLD AUTO: 9.8 FL (ref 8–12)
POTASSIUM BLD-SCNC: 4.4 MMOL/L (ref 3.4–5)
PROT SERPL-MCNC: 7 G/DL (ref 6.3–8.2)
RBC # BLD AUTO: 5.05 10*6/MM3 (ref 3.77–5.16)
SODIUM BLD-SCNC: 142 MMOL/L (ref 137–145)
TSH SERPL DL<=0.05 MIU/L-ACNC: 1.99 MIU/ML (ref 0.46–4.68)
WBC NRBC COR # BLD: 5.56 10*3/MM3 (ref 3.2–9.8)

## 2018-07-26 PROCEDURE — 84443 ASSAY THYROID STIM HORMONE: CPT | Performed by: INTERNAL MEDICINE

## 2018-07-26 PROCEDURE — 85025 COMPLETE CBC W/AUTO DIFF WBC: CPT | Performed by: INTERNAL MEDICINE

## 2018-07-26 PROCEDURE — 36415 COLL VENOUS BLD VENIPUNCTURE: CPT | Performed by: INTERNAL MEDICINE

## 2018-07-26 PROCEDURE — 80053 COMPREHEN METABOLIC PANEL: CPT | Performed by: INTERNAL MEDICINE

## 2018-08-01 ENCOUNTER — OFFICE VISIT (OUTPATIENT)
Dept: FAMILY MEDICINE CLINIC | Facility: CLINIC | Age: 58
End: 2018-08-01

## 2018-08-01 VITALS
BODY MASS INDEX: 21.26 KG/M2 | HEART RATE: 72 BPM | TEMPERATURE: 98 F | SYSTOLIC BLOOD PRESSURE: 100 MMHG | HEIGHT: 63 IN | DIASTOLIC BLOOD PRESSURE: 60 MMHG | WEIGHT: 120 LBS

## 2018-08-01 DIAGNOSIS — Z72.0 TOBACCO ABUSE: Chronic | ICD-10-CM

## 2018-08-01 DIAGNOSIS — K21.00 GASTROESOPHAGEAL REFLUX DISEASE WITH ESOPHAGITIS: Primary | Chronic | ICD-10-CM

## 2018-08-01 DIAGNOSIS — R63.4 WEIGHT LOSS, UNINTENTIONAL: Chronic | ICD-10-CM

## 2018-08-01 DIAGNOSIS — H61.21 IMPACTED CERUMEN OF RIGHT EAR: ICD-10-CM

## 2018-08-01 DIAGNOSIS — F41.1 GAD (GENERALIZED ANXIETY DISORDER): Chronic | ICD-10-CM

## 2018-08-01 DIAGNOSIS — E78.2 MIXED HYPERLIPIDEMIA: Chronic | ICD-10-CM

## 2018-08-01 DIAGNOSIS — G47.9 SLEEP DISORDER: Chronic | ICD-10-CM

## 2018-08-01 DIAGNOSIS — Z86.010 HISTORY OF COLONIC POLYPS: Chronic | ICD-10-CM

## 2018-08-01 DIAGNOSIS — F33.1 MAJOR DEPRESSIVE DISORDER, RECURRENT, MODERATE (HCC): Chronic | ICD-10-CM

## 2018-08-01 DIAGNOSIS — G44.209 TENSION HEADACHE: Chronic | ICD-10-CM

## 2018-08-01 PROBLEM — K59.1 FUNCTIONAL DIARRHEA: Chronic | Status: ACTIVE | Noted: 2017-09-19

## 2018-08-01 PROBLEM — R19.7 DIARRHEA: Chronic | Status: ACTIVE | Noted: 2017-09-19

## 2018-08-01 PROCEDURE — 99214 OFFICE O/P EST MOD 30 MIN: CPT | Performed by: INTERNAL MEDICINE

## 2018-08-01 PROCEDURE — 69209 REMOVE IMPACTED EAR WAX UNI: CPT | Performed by: INTERNAL MEDICINE

## 2018-08-01 NOTE — PATIENT INSTRUCTIONS
Steps to Quit Smoking  Smoking tobacco can be harmful to your health and can affect almost every organ in your body. Smoking puts you, and those around you, at risk for developing many serious chronic diseases. Quitting smoking is difficult, but it is one of the best things that you can do for your health. It is never too late to quit.  What are the benefits of quitting smoking?  When you quit smoking, you lower your risk of developing serious diseases and conditions, such as:  · Lung cancer or lung disease, such as COPD.  · Heart disease.  · Stroke.  · Heart attack.  · Infertility.  · Osteoporosis and bone fractures.    Additionally, symptoms such as coughing, wheezing, and shortness of breath may get better when you quit. You may also find that you get sick less often because your body is stronger at fighting off colds and infections. If you are pregnant, quitting smoking can help to reduce your chances of having a baby of low birth weight.  How do I get ready to quit?  When you decide to quit smoking, create a plan to make sure that you are successful. Before you quit:  · Pick a date to quit. Set a date within the next two weeks to give you time to prepare.  · Write down the reasons why you are quitting. Keep this list in places where you will see it often, such as on your bathroom mirror or in your car or wallet.  · Identify the people, places, things, and activities that make you want to smoke (triggers) and avoid them. Make sure to take these actions:  ? Throw away all cigarettes at home, at work, and in your car.  ? Throw away smoking accessories, such as ashtrays and lighters.  ? Clean your car and make sure to empty the ashtray.  ? Clean your home, including curtains and carpets.  · Tell your family, friends, and coworkers that you are quitting. Support from your loved ones can make quitting easier.  · Talk with your health care provider about your options for quitting smoking.  · Find out what treatment  options are covered by your health insurance.    What strategies can I use to quit smoking?  Talk with your healthcare provider about different strategies to quit smoking. Some strategies include:  · Quitting smoking altogether instead of gradually lessening how much you smoke over a period of time. Research shows that quitting “cold turkey” is more successful than gradually quitting.  · Attending in-person counseling to help you build problem-solving skills. You are more likely to have success in quitting if you attend several counseling sessions. Even short sessions of 10 minutes can be effective.  · Finding resources and support systems that can help you to quit smoking and remain smoke-free after you quit. These resources are most helpful when you use them often. They can include:  ? Online chats with a counselor.  ? Telephone quitlines.  ? Printed self-help materials.  ? Support groups or group counseling.  ? Text messaging programs.  ? Mobile phone applications.  · Taking medicines to help you quit smoking. (If you are pregnant or breastfeeding, talk with your health care provider first.) Some medicines contain nicotine and some do not. Both types of medicines help with cravings, but the medicines that include nicotine help to relieve withdrawal symptoms. Your health care provider may recommend:  ? Nicotine patches, gum, or lozenges.  ? Nicotine inhalers or sprays.  ? Non-nicotine medicine that is taken by mouth.    Talk with your health care provider about combining strategies, such as taking medicines while you are also receiving in-person counseling. Using these two strategies together makes you more likely to succeed in quitting than if you used either strategy on its own.  If you are pregnant or breastfeeding, talk with your health care provider about finding counseling or other support strategies to quit smoking. Do not take medicine to help you quit smoking unless told to do so by your health care  provider.  What things can I do to make it easier to quit?  Quitting smoking might feel overwhelming at first, but there is a lot that you can do to make it easier. Take these important actions:  · Reach out to your family and friends and ask that they support and encourage you during this time. Call telephone quitlines, reach out to support groups, or work with a counselor for support.  · Ask people who smoke to avoid smoking around you.  · Avoid places that trigger you to smoke, such as bars, parties, or smoke-break areas at work.  · Spend time around people who do not smoke.  · Lessen stress in your life, because stress can be a smoking trigger for some people. To lessen stress, try:  ? Exercising regularly.  ? Deep-breathing exercises.  ? Yoga.  ? Meditating.  ? Performing a body scan. This involves closing your eyes, scanning your body from head to toe, and noticing which parts of your body are particularly tense. Purposefully relax the muscles in those areas.  · Download or purchase mobile phone or tablet apps (applications) that can help you stick to your quit plan by providing reminders, tips, and encouragement. There are many free apps, such as QuitGuide from the CDC (Centers for Disease Control and Prevention). You can find other support for quitting smoking (smoking cessation) through smokefree.gov and other websites.    How will I feel when I quit smoking?  Within the first 24 hours of quitting smoking, you may start to feel some withdrawal symptoms. These symptoms are usually most noticeable 2-3 days after quitting, but they usually do not last beyond 2-3 weeks. Changes or symptoms that you might experience include:  · Mood swings.  · Restlessness, anxiety, or irritation.  · Difficulty concentrating.  · Dizziness.  · Strong cravings for sugary foods in addition to nicotine.  · Mild weight gain.  · Constipation.  · Nausea.  · Coughing or a sore throat.  · Changes in how your medicines work in your  body.  · A depressed mood.  · Difficulty sleeping (insomnia).    After the first 2-3 weeks of quitting, you may start to notice more positive results, such as:  · Improved sense of smell and taste.  · Decreased coughing and sore throat.  · Slower heart rate.  · Lower blood pressure.  · Clearer skin.  · The ability to breathe more easily.  · Fewer sick days.    Quitting smoking is very challenging for most people. Do not get discouraged if you are not successful the first time. Some people need to make many attempts to quit before they achieve long-term success. Do your best to stick to your quit plan, and talk with your health care provider if you have any questions or concerns.  This information is not intended to replace advice given to you by your health care provider. Make sure you discuss any questions you have with your health care provider.  Document Released: 12/12/2002 Document Revised: 08/15/2017 Document Reviewed: 05/03/2016  [employer] Patient Education © 2018 [employer]

## 2018-08-01 NOTE — PROGRESS NOTES
Subjective        History of Present Illness     (Reyna) Valerie Mayfield is a 58 y.o. female who presents for annual follow up on chronic conditions including GERD, MALISSA, major depressive disorder, mixed hyperlipidemia, and history of colon polyps.  She established care last year.  Her  is a retired  and works at [employer].  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      In March, I added BuSpar to her Lexapro.  She has Trazodone on hand to use as needed, but is not currently requiring the Trazodone for sleep.   She feels like her anxiety is adequately controlled with the combination of Lexapro and Buspar.   She seems to be vastly improved.    She reports frequent episodes of diarrhea, but denies GERD symptoms with Protonix.  I recommended OTC Imodium.  She thought about trying metamucil first.  I told her she could try the Imodium if diarrhea is not resolved with the metamucil.   She is up to date on colonoscopy.  I advised changing Protonix to twice daily Zantac now that her symptoms are improved, but she wants to remain on Protonix, as her symptoms were previously uncontrolled on Zantac.    She continues to smoke about a pack of cigarettes daily.  I advised the patient of the risks in continuing to use tobacco products.  Patient is urged to stop smoking and never start again.   I prescribed Chantix, but she was unable to purchase the Chantix.  She is covered under [facility].        She reports frequent headaches and ear pressure.  She has been applying ear wax softening drops daily.  I recommended decreasing the drops to once monthly.   Exam of the left ear reveals tiny effusion on the left and small amount of cerumen, which is removed with Q-tip.  Exam of the right ear reveals cerumen impaction. Warm water ear lavage irrigation of right ear is completed today without difficulty or complications.  She seems to be describing tension  "type headaches.    After the patient left, I was able to obtain some older lab results, which revealed total cholesterol 224 and LDL cholesterol 154 with HDL 51.  I will include a lipid profile with next year's labs.    Weight is up 6 pounds in the past year.  Blood pressure at goal.     The patient's relevant past medical, surgical, and social history was reviewed in Epic.   Lab results are reviewed with the patient today.  CBC and CMP unremarkable other than hemoglobin upper limits of normal due to polycythemia.  Renal and liver function normal.   Thyroid function normal.      Review of Systems   Constitutional: Negative for chills, fatigue and fever.   HENT: Positive for ear pain. Negative for congestion, postnasal drip, sinus pressure and sore throat.    Respiratory: Negative for cough, shortness of breath and wheezing.    Cardiovascular: Negative for chest pain, palpitations and leg swelling.   Gastrointestinal: Negative for abdominal pain, blood in stool, constipation, diarrhea, nausea and vomiting.   Endocrine: Negative for cold intolerance, heat intolerance, polydipsia and polyuria.   Genitourinary: Negative for dysuria, frequency, hematuria and urgency.   Skin: Negative for rash.   Neurological: Positive for headaches. Negative for syncope and weakness.        Objective      Vitals:    08/01/18 1453   BP: 100/60   Pulse: 72   Temp: 98 °F (36.7 °C)   TempSrc: Oral   Weight: 54.4 kg (120 lb)   Height: 160 cm (63\")       Physical Exam   Constitutional: She is oriented to person, place, and time. She appears well-developed and well-nourished. No distress.   HENT:   Head: Normocephalic and atraumatic.   Nose: Right sinus exhibits no maxillary sinus tenderness and no frontal sinus tenderness. Left sinus exhibits no maxillary sinus tenderness and no frontal sinus tenderness.   Mouth/Throat: Uvula is midline, oropharynx is clear and moist and mucous membranes are normal. No oral lesions. No tonsillar exudate. "   Exam of the left ear reveals tiny effusion on the left and small amount of cerumen, which is removed with Q-tip.  Exam of the right ear reveals cerumen impaction.    Eyes: Pupils are equal, round, and reactive to light. Conjunctivae and EOM are normal.   Neck: Trachea normal. Neck supple. No JVD present. Carotid bruit is not present. No tracheal deviation present. No thyroid mass and no thyromegaly present.   Cardiovascular: Normal rate, regular rhythm, normal heart sounds and intact distal pulses.   No extrasystoles are present. PMI is not displaced.    No murmur heard.  Pulmonary/Chest: Effort normal and breath sounds normal. No accessory muscle usage. No respiratory distress. She has no decreased breath sounds. She has no wheezes. She has no rhonchi. She has no rales.   Abdominal: Soft. Bowel sounds are normal. She exhibits no distension. There is no hepatosplenomegaly. There is no tenderness.     Vascular Status -  Her right foot exhibits normal foot vasculature  and no edema. Her left foot exhibits normal foot vasculature  and no edema.  Lymphadenopathy:     She has no cervical adenopathy.   Neurological: She is alert and oriented to person, place, and time. No cranial nerve deficit. Coordination normal.   Skin: Skin is warm, dry and intact. No rash noted. No cyanosis. Nails show no clubbing.   Psychiatric: She has a normal mood and affect. Her speech is normal and behavior is normal. Judgment and thought content normal.   Vitals reviewed.      Assessment/Plan      Continue the combination of Buspar and Lexapro to control anxiety.  She has Trazodone on hand to use for sleep p.r.n., but has not been requiring it for sleep.     Ear Cerumen Removal Instrumentation  Date/Time: 8/1/2018 3:20 PM  Performed by: TATO LEE  Authorized by: TATO LEE   Ceruminolytics applied: Ceruminolytics applied prior to the procedure.  Location details: right ear  Patient tolerance: Patient tolerated the procedure well with  no immediate complications  Comments: After informed verbal consent, warm water ear lavage irrigation of right ear is completed today without complications or difficulty.  She tolerated well.    Procedure type: irrigation   Sedation:  Patient sedated: no            I advised the patient of the risks of continuing to use tobacco products.  Patient is urged to stop smoking and never start again. Cessation aids are offered.  She reports Chantix was not  under her [facility].  I recommended she recheck this periodically.  I advised Reyna of the risks of continuing to use tobacco, and I provided her with tobacco cessation educational materials in the After Visit Summary.     During this visit, I spent 6 minutes counseling the patient regarding tobacco cessation.    She will try metamucil to help bulk the stool.  If diarrhea persists, I recommended OTC Imodium per label directions.     She was not interested in trying to change Protonix to Zantac twice daily.    Continue the current dose of Lexapro and BuSpar.  Continue nonpharmacological measures to also help address depression and anxiety tendencies.  She is doing so much better.  I encouraged her to get involved in a local [employer].    Her next colonoscopy will be due in 2022.  She has a history of colon polyps and they recommended repeat colonoscopy in 5 years.    Include lipid profile with next years labs.  Low-cholesterol diet.  Weight is at goal.    Return in one year for annual exam with fasting labs one week prior.       Scribed for Dr. Cochran by Mihaela Calderón [facility].     Diagnoses and all orders for this visit:    Gastroesophageal reflux disease with esophagitis    Major depressive disorder, recurrent, moderate (CMS/HCC)    MALISSA (generalized anxiety disorder)    History of colonic polyps    Weight loss, unintentional    Tobacco abuse    Sleep disorder    Tension headache    Mixed hyperlipidemia  -     CBC Auto Differential; Future  -     Comprehensive  Metabolic Panel; Future  -     Lipid Panel; Future    Impacted cerumen of right ear  -     Ear Cerumen Removal        Lab on 07/26/2018   Component Date Value Ref Range Status   • Glucose 07/26/2018 96  74 - 99 mg/dL Final   • BUN 07/26/2018 14  7 - 17 mg/dL Final   • Creatinine 07/26/2018 0.58  0.52 - 1.04 mg/dL Final   • Sodium 07/26/2018 142  137 - 145 mmol/L Final   • Potassium 07/26/2018 4.4  3.4 - 5.0 mmol/L Final   • Chloride 07/26/2018 105  98 - 107 mmol/L Final   • CO2 07/26/2018 30.0  22.0 - 30.0 mmol/L Final   • Calcium 07/26/2018 9.2  8.4 - 10.2 mg/dL Final   • Total Protein 07/26/2018 7.0  6.3 - 8.2 g/dL Final   • Albumin 07/26/2018 4.30  3.50 - 5.00 g/dL Final   • ALT (SGPT) 07/26/2018 27  <=35 U/L Final   • AST (SGOT) 07/26/2018 28  14 - 36 U/L Final   • Alkaline Phosphatase 07/26/2018 67  38 - 126 U/L Final   • Total Bilirubin 07/26/2018 0.4  0.2 - 1.3 mg/dL Final   • eGFR Non African Amer 07/26/2018 108  51 - 120 mL/min/1.73 Final   • Globulin 07/26/2018 2.7  2.3 - 3.5 gm/dL Final   • A/G Ratio 07/26/2018 1.6  1.1 - 1.8 g/dL Final   • BUN/Creatinine Ratio 07/26/2018 24.1  7.0 - 25.0 Final   • Anion Gap 07/26/2018 7.0  5.0 - 15.0 mmol/L Final   • WBC 07/26/2018 5.56  3.20 - 9.80 10*3/mm3 Final   • RBC 07/26/2018 5.05  3.77 - 5.16 10*6/mm3 Final   • Hemoglobin 07/26/2018 14.7  12.0 - 15.5 g/dL Final   • Hematocrit 07/26/2018 46.0* 35.0 - 45.0 % Final   • MCV 07/26/2018 91.1  80.0 - 98.0 fL Final   • MCH 07/26/2018 29.1  26.5 - 34.0 pg Final   • MCHC 07/26/2018 32.0  31.4 - 36.0 g/dL Final   • RDW 07/26/2018 14.5  11.5 - 14.5 % Final   • RDW-SD 07/26/2018 47.0* 36.4 - 46.3 fl Final   • MPV 07/26/2018 9.8  8.0 - 12.0 fL Final   • Platelets 07/26/2018 167  150 - 450 10*3/mm3 Final   • Neutrophil % 07/26/2018 46.6  37.0 - 80.0 % Final   • Lymphocyte % 07/26/2018 44.2  10.0 - 50.0 % Final   • Monocyte % 07/26/2018 5.6  0.0 - 12.0 % Final   • Eosinophil % 07/26/2018 3.1  0.0 - 7.0 % Final   • Basophil %  07/26/2018 0.5  0.0 - 2.0 % Final   • Neutrophils, Absolute 07/26/2018 2.59  2.00 - 8.60 10*3/mm3 Final   • Lymphocytes, Absolute 07/26/2018 2.46  0.60 - 4.20 10*3/mm3 Final   • Monocytes, Absolute 07/26/2018 0.31  0.00 - 0.90 10*3/mm3 Final   • Eosinophils, Absolute 07/26/2018 0.17  0.00 - 0.70 10*3/mm3 Final   • Basophils, Absolute 07/26/2018 0.03  0.00 - 0.20 10*3/mm3 Final   • TSH 07/26/2018 1.990  0.460 - 4.680 mIU/mL Final   ]

## 2019-05-01 RX ORDER — PANTOPRAZOLE SODIUM 40 MG/1
40 TABLET, DELAYED RELEASE ORAL DAILY
Qty: 90 TABLET | Refills: 3 | Status: SHIPPED | OUTPATIENT
Start: 2019-05-01 | End: 2020-06-16

## 2019-05-01 RX ORDER — SCOLOPAMINE TRANSDERMAL SYSTEM 1 MG/1
1 PATCH, EXTENDED RELEASE TRANSDERMAL
Qty: 3 PATCH | Refills: 0 | Status: SHIPPED | OUTPATIENT
Start: 2019-05-01 | End: 2019-07-24 | Stop reason: SDUPTHER

## 2019-05-01 RX ORDER — ESCITALOPRAM OXALATE 10 MG/1
10 TABLET ORAL DAILY
Qty: 90 TABLET | Refills: 3 | Status: SHIPPED | OUTPATIENT
Start: 2019-05-01 | End: 2020-06-16

## 2019-07-24 DIAGNOSIS — F41.1 GAD (GENERALIZED ANXIETY DISORDER): Chronic | ICD-10-CM

## 2019-07-24 DIAGNOSIS — E78.2 MIXED HYPERLIPIDEMIA: Primary | Chronic | ICD-10-CM

## 2019-07-24 RX ORDER — SCOLOPAMINE TRANSDERMAL SYSTEM 1 MG/1
1 PATCH, EXTENDED RELEASE TRANSDERMAL
Qty: 4 PATCH | Refills: 0 | Status: SHIPPED | OUTPATIENT
Start: 2019-07-24 | End: 2019-08-06 | Stop reason: SDUPTHER

## 2019-08-06 RX ORDER — SCOLOPAMINE TRANSDERMAL SYSTEM 1 MG/1
1 PATCH, EXTENDED RELEASE TRANSDERMAL
Qty: 4 PATCH | Refills: 0 | Status: SHIPPED | OUTPATIENT
Start: 2019-08-06 | End: 2019-08-30

## 2019-08-26 ENCOUNTER — LAB (OUTPATIENT)
Dept: LAB | Facility: OTHER | Age: 59
End: 2019-08-26

## 2019-08-26 DIAGNOSIS — F41.1 GAD (GENERALIZED ANXIETY DISORDER): ICD-10-CM

## 2019-08-26 DIAGNOSIS — E78.2 MIXED HYPERLIPIDEMIA: ICD-10-CM

## 2019-08-26 LAB
ALBUMIN SERPL-MCNC: 4.1 G/DL (ref 3.5–5)
ALBUMIN/GLOB SERPL: 1.5 G/DL (ref 1.1–1.8)
ALP SERPL-CCNC: 62 U/L (ref 38–126)
ALT SERPL W P-5'-P-CCNC: 20 U/L
ANION GAP SERPL CALCULATED.3IONS-SCNC: 9 MMOL/L (ref 5–15)
AST SERPL-CCNC: 25 U/L (ref 14–36)
BASOPHILS # BLD AUTO: 0.04 10*3/MM3 (ref 0–0.2)
BASOPHILS NFR BLD AUTO: 0.6 % (ref 0–1.5)
BILIRUB SERPL-MCNC: 0.5 MG/DL (ref 0.2–1.3)
BUN BLD-MCNC: 6 MG/DL (ref 7–23)
BUN/CREAT SERPL: 11.3 (ref 7–25)
CALCIUM SPEC-SCNC: 9.2 MG/DL (ref 8.4–10.2)
CHLORIDE SERPL-SCNC: 105 MMOL/L (ref 101–112)
CHOLEST SERPL-MCNC: 224 MG/DL (ref 150–200)
CO2 SERPL-SCNC: 27 MMOL/L (ref 22–30)
CREAT BLD-MCNC: 0.53 MG/DL (ref 0.52–1.04)
DEPRECATED RDW RBC AUTO: 45.6 FL (ref 37–54)
EOSINOPHIL # BLD AUTO: 0.13 10*3/MM3 (ref 0–0.4)
EOSINOPHIL NFR BLD AUTO: 2 % (ref 0.3–6.2)
ERYTHROCYTE [DISTWIDTH] IN BLOOD BY AUTOMATED COUNT: 14 % (ref 12.3–15.4)
GFR SERPL CREATININE-BSD FRML MDRD: 119 ML/MIN/1.73 (ref 51–120)
GLOBULIN UR ELPH-MCNC: 2.7 GM/DL (ref 2.3–3.5)
GLUCOSE BLD-MCNC: 155 MG/DL (ref 70–99)
HCT VFR BLD AUTO: 44.1 % (ref 34–46.6)
HDLC SERPL-MCNC: 59 MG/DL (ref 40–59)
HGB BLD-MCNC: 14.7 G/DL (ref 12–15.9)
LDLC SERPL CALC-MCNC: 150 MG/DL
LDLC/HDLC SERPL: 2.55 {RATIO} (ref 0–3.22)
LYMPHOCYTES # BLD AUTO: 2.65 10*3/MM3 (ref 0.7–3.1)
LYMPHOCYTES NFR BLD AUTO: 40.4 % (ref 19.6–45.3)
MCH RBC QN AUTO: 30.2 PG (ref 26.6–33)
MCHC RBC AUTO-ENTMCNC: 33.3 G/DL (ref 31.5–35.7)
MCV RBC AUTO: 90.7 FL (ref 79–97)
MONOCYTES # BLD AUTO: 0.34 10*3/MM3 (ref 0.1–0.9)
MONOCYTES NFR BLD AUTO: 5.2 % (ref 5–12)
NEUTROPHILS # BLD AUTO: 3.4 10*3/MM3 (ref 1.7–7)
NEUTROPHILS NFR BLD AUTO: 51.8 % (ref 42.7–76)
PLATELET # BLD AUTO: 221 10*3/MM3 (ref 140–450)
PMV BLD AUTO: 9.9 FL (ref 6–12)
POTASSIUM BLD-SCNC: 4.3 MMOL/L (ref 3.4–5)
PROT SERPL-MCNC: 6.8 G/DL (ref 6.3–8.6)
RBC # BLD AUTO: 4.86 10*6/MM3 (ref 3.77–5.28)
SODIUM BLD-SCNC: 141 MMOL/L (ref 137–145)
TRIGL SERPL-MCNC: 74 MG/DL
VLDLC SERPL-MCNC: 14.8 MG/DL
WBC NRBC COR # BLD: 6.56 10*3/MM3 (ref 3.4–10.8)

## 2019-08-26 PROCEDURE — 85025 COMPLETE CBC W/AUTO DIFF WBC: CPT | Performed by: INTERNAL MEDICINE

## 2019-08-26 PROCEDURE — 36415 COLL VENOUS BLD VENIPUNCTURE: CPT | Performed by: INTERNAL MEDICINE

## 2019-08-26 PROCEDURE — 80053 COMPREHEN METABOLIC PANEL: CPT | Performed by: INTERNAL MEDICINE

## 2019-08-26 PROCEDURE — 80061 LIPID PANEL: CPT | Performed by: INTERNAL MEDICINE

## 2019-08-30 ENCOUNTER — OFFICE VISIT (OUTPATIENT)
Dept: FAMILY MEDICINE CLINIC | Facility: CLINIC | Age: 59
End: 2019-08-30

## 2019-08-30 VITALS
BODY MASS INDEX: 21.93 KG/M2 | HEIGHT: 63 IN | DIASTOLIC BLOOD PRESSURE: 60 MMHG | TEMPERATURE: 97.8 F | SYSTOLIC BLOOD PRESSURE: 100 MMHG | WEIGHT: 123.8 LBS | HEART RATE: 68 BPM

## 2019-08-30 DIAGNOSIS — F41.1 GAD (GENERALIZED ANXIETY DISORDER): Chronic | ICD-10-CM

## 2019-08-30 DIAGNOSIS — Z72.0 TOBACCO ABUSE: Chronic | ICD-10-CM

## 2019-08-30 DIAGNOSIS — E78.2 MIXED HYPERLIPIDEMIA: Primary | Chronic | ICD-10-CM

## 2019-08-30 DIAGNOSIS — K21.00 GASTROESOPHAGEAL REFLUX DISEASE WITH ESOPHAGITIS: Chronic | ICD-10-CM

## 2019-08-30 DIAGNOSIS — Z86.010 HISTORY OF COLONIC POLYPS: Chronic | ICD-10-CM

## 2019-08-30 DIAGNOSIS — K59.1 FUNCTIONAL DIARRHEA: Chronic | ICD-10-CM

## 2019-08-30 DIAGNOSIS — I87.2 CHRONIC VENOUS INSUFFICIENCY: Chronic | ICD-10-CM

## 2019-08-30 DIAGNOSIS — M79.644 PAIN OF FINGER OF RIGHT HAND: ICD-10-CM

## 2019-08-30 DIAGNOSIS — G44.209 TENSION HEADACHE: Chronic | ICD-10-CM

## 2019-08-30 DIAGNOSIS — F33.1 MAJOR DEPRESSIVE DISORDER, RECURRENT, MODERATE (HCC): Chronic | ICD-10-CM

## 2019-08-30 PROBLEM — G47.9 SLEEP DISORDER: Chronic | Status: RESOLVED | Noted: 2018-02-28 | Resolved: 2019-08-30

## 2019-08-30 PROBLEM — R63.4 WEIGHT LOSS, UNINTENTIONAL: Chronic | Status: RESOLVED | Noted: 2017-07-18 | Resolved: 2019-08-30

## 2019-08-30 PROCEDURE — 99214 OFFICE O/P EST MOD 30 MIN: CPT | Performed by: INTERNAL MEDICINE

## 2019-08-30 NOTE — PROGRESS NOTES
Subjective        History of Present Illness     Reyna Mayfield is a 59 y.o. female who presents for annual follow up on chronic conditions including GERD, MALISSA, major depressive disorder, mixed hyperlipidemia, and history of colon polyps.  Her  is a retired  and works at [employer].  She works at [employer] in [place]. GERD symptoms adequately managed with Protonix.      She continues to smoke around a pack of cigarettes daily.  I advised the patient of the risks in continuing to use tobacco products.  Patient is urged to stop smoking and never start again. Cessation aids are offered.  She is not currently motivated to stop.  I encouraged her to try to decrease the amount she smokes if she is not interested in cessation aids.  She also drinks a lot of caffeine.  She denies chest pain.  She started an 81 mg aspirin on her own due to family history of CAD, although, she has no personal history of heart disease.  We discussed risks versus benefits and recommended she discontinue the daily aspirin therapy.     She is overdue mammogram.  I encouraged patient to schedule this.  She follows with Nita Lara for GYN exams.     She has noticed mild lower extremity edema.  Exam reveals mild varicosities.   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  I recommended wearing top of the calf compression stockings, sodium restriction, and maintaining a goal body weight.         She feels her anxiety is adequately managed with Lexapro. She stopped Buspar.   She is not requiring the Trazodone for sleep.  She reports she is so tired at the end of the work day, she sleeps fine.      Labs reveal elevated fasting glucose, although, patient was not completely fasting.  She had sugar sweetened coffee the morning prior to labs being drawn.  She has been eating more chocolate recently as well.  She has a family history of diabetes.  I recommended she start decreasing  "sugar intake to delay progression of impaired fasting glucose.        She is reporting swelling of the right index finger, which could be from overuse as she is frequently opening boxes and using the fingers repetitively. I offered referral to orthopedist.  She declines.  She would like to give it time to improve on its own.      She is up to date on colonoscopy until 11/2022.      Weight is up 3 pounds in the past year.  BMI remains good at 21.9.    The patient's relevant past medical, surgical, and social history was reviewed in Epic.   Lab results are reviewed with the patient today. CBC unremarkable. 150.   Fasting glucose above goal at 155, although, she reports she had coffee sweetened with sugar prior to labs.  Normal renal and liver function.  Total cholesterol elevated at 224. HDL good at 59.  .  Triglycerides 74.         Review of Systems   Constitutional: Negative for chills, fatigue and fever.   HENT: Negative for congestion, ear pain, postnasal drip, sinus pressure and sore throat.    Respiratory: Negative for cough, shortness of breath and wheezing.    Cardiovascular: Negative for chest pain, palpitations and leg swelling.   Gastrointestinal: Negative for abdominal pain, blood in stool, constipation, diarrhea, nausea and vomiting.   Endocrine: Negative for cold intolerance, heat intolerance, polydipsia and polyuria.   Genitourinary: Negative for dysuria, frequency, hematuria and urgency.   Skin: Negative for rash.   Neurological: Negative for syncope and weakness.        Objective     Vitals:    08/30/19 1444   BP: 100/60   Pulse: 68   Temp: 97.8 °F (36.6 °C)   TempSrc: Oral   Weight: 56.2 kg (123 lb 12.8 oz)   Height: 160 cm (63\")     Physical Exam   Constitutional: She is oriented to person, place, and time. She appears well-developed and well-nourished. No distress.   HENT:   Head: Normocephalic and atraumatic.   Nose: Right sinus exhibits no maxillary sinus tenderness and no frontal sinus " tenderness. Left sinus exhibits no maxillary sinus tenderness and no frontal sinus tenderness.   Mouth/Throat: Uvula is midline, oropharynx is clear and moist and mucous membranes are normal. No oral lesions. No tonsillar exudate.   Benign posterior smoker's changes noted.    Eyes: Conjunctivae and EOM are normal. Pupils are equal, round, and reactive to light.   Neck: Trachea normal. Neck supple. No JVD present. Carotid bruit is not present. No tracheal deviation present. No thyroid mass and no thyromegaly present.   Cardiovascular: Normal rate, regular rhythm, normal heart sounds and intact distal pulses.  No extrasystoles are present. PMI is not displaced.   No murmur heard.  Pulmonary/Chest: Effort normal and breath sounds normal. No accessory muscle usage. No respiratory distress. She has no decreased breath sounds. She has no wheezes. She has no rhonchi. She has no rales.   Chronic lung sounds.    Abdominal: Soft. Bowel sounds are normal. She exhibits no distension. There is no hepatosplenomegaly. There is no tenderness.     Vascular Status -  Her right foot exhibits normal foot vasculature  and no edema. Her left foot exhibits normal foot vasculature  and no edema.  Lymphadenopathy:     She has no cervical adenopathy.   Neurological: She is alert and oriented to person, place, and time. No cranial nerve deficit. Coordination normal.   Skin: Skin is warm, dry and intact. No rash noted. No cyanosis. Nails show no clubbing.   Psychiatric: She has a normal mood and affect. Her speech is normal and behavior is normal. Judgment and thought content normal.   Vitals reviewed.          Assessment/Plan      Recommended top of the calf support stockings, sodium restriction, and maintaining a goal body weight to help manage edema due to chronic venous insufficiency and varicosities.     Continue the Lexapro for MALISSA.     Recommended she schedule overdue mammogram.     Continue Protonix for GERD.     I advised the patient  of the risks in continuing to use tobacco products.  Patient is urged to stop smoking and never start again. Cessation aids are offered.   She declines.  I encouraged her to at least cut back on tobacco use.     Discontinue the 81 mg daily aspirin.      Decrease sugar and carbohydrates to delay progression of impaired fasting glucose.      Return in one year for annual exam with fasting labs one week prior.      Scribed for Dr. Cochran by Mihaela Calderón [facility].     Diagnoses and all orders for this visit:    Mixed hyperlipidemia  -     Comprehensive Metabolic Panel; Future  -     CBC Auto Differential; Future  -     Lipid Panel; Future  -     TSH; Future    Functional diarrhea    Gastroesophageal reflux disease with esophagitis    Tension headache    MALISSA (generalized anxiety disorder)    History of colonic polyps    Major depressive disorder, recurrent, moderate (CMS/HCC)    tobacco abuse - 1ppd    Chronic venous insufficiency    Pain of finger of right hand        Lab on 08/26/2019   Component Date Value Ref Range Status   • Glucose 08/26/2019 155* 70 - 99 mg/dL Final   • BUN 08/26/2019 6* 7 - 23 mg/dL Final   • Creatinine 08/26/2019 0.53  0.52 - 1.04 mg/dL Final   • Sodium 08/26/2019 141  137 - 145 mmol/L Final   • Potassium 08/26/2019 4.3  3.4 - 5.0 mmol/L Final   • Chloride 08/26/2019 105  101 - 112 mmol/L Final   • CO2 08/26/2019 27.0  22.0 - 30.0 mmol/L Final   • Calcium 08/26/2019 9.2  8.4 - 10.2 mg/dL Final   • Total Protein 08/26/2019 6.8  6.3 - 8.6 g/dL Final   • Albumin 08/26/2019 4.10  3.50 - 5.00 g/dL Final   • ALT (SGPT) 08/26/2019 20  <=35 U/L Final   • AST (SGOT) 08/26/2019 25  14 - 36 U/L Final   • Alkaline Phosphatase 08/26/2019 62  38 - 126 U/L Final   • Total Bilirubin 08/26/2019 0.5  0.2 - 1.3 mg/dL Final   • eGFR Non  Amer 08/26/2019 119  51 - 120 mL/min/1.73 Final   • Globulin 08/26/2019 2.7  2.3 - 3.5 gm/dL Final   • A/G Ratio 08/26/2019 1.5  1.1 - 1.8 g/dL Final   • BUN/Creatinine  Ratio 08/26/2019 11.3  7.0 - 25.0 Final   • Anion Gap 08/26/2019 9.0  5.0 - 15.0 mmol/L Final   • Total Cholesterol 08/26/2019 224* 150 - 200 mg/dL Final   • Triglycerides 08/26/2019 74  <=150 mg/dL Final   • HDL Cholesterol 08/26/2019 59  40 - 59 mg/dL Final   • LDL Cholesterol  08/26/2019 150* <=100 mg/dL Final   • VLDL Cholesterol 08/26/2019 14.8  mg/dL Final   • LDL/HDL Ratio 08/26/2019 2.55  0.00 - 3.22 Final   • WBC 08/26/2019 6.56  3.40 - 10.80 10*3/mm3 Final   • RBC 08/26/2019 4.86  3.77 - 5.28 10*6/mm3 Final   • Hemoglobin 08/26/2019 14.7  12.0 - 15.9 g/dL Final   • Hematocrit 08/26/2019 44.1  34.0 - 46.6 % Final   • MCV 08/26/2019 90.7  79.0 - 97.0 fL Final   • MCH 08/26/2019 30.2  26.6 - 33.0 pg Final   • MCHC 08/26/2019 33.3  31.5 - 35.7 g/dL Final   • RDW 08/26/2019 14.0  12.3 - 15.4 % Final   • RDW-SD 08/26/2019 45.6  37.0 - 54.0 fl Final   • MPV 08/26/2019 9.9  6.0 - 12.0 fL Final   • Platelets 08/26/2019 221  140 - 450 10*3/mm3 Final   • Neutrophil % 08/26/2019 51.8  42.7 - 76.0 % Final   • Lymphocyte % 08/26/2019 40.4  19.6 - 45.3 % Final   • Monocyte % 08/26/2019 5.2  5.0 - 12.0 % Final   • Eosinophil % 08/26/2019 2.0  0.3 - 6.2 % Final   • Basophil % 08/26/2019 0.6  0.0 - 1.5 % Final   • Neutrophils, Absolute 08/26/2019 3.40  1.70 - 7.00 10*3/mm3 Final   • Lymphocytes, Absolute 08/26/2019 2.65  0.70 - 3.10 10*3/mm3 Final   • Monocytes, Absolute 08/26/2019 0.34  0.10 - 0.90 10*3/mm3 Final   • Eosinophils, Absolute 08/26/2019 0.13  0.00 - 0.40 10*3/mm3 Final   • Basophils, Absolute 08/26/2019 0.04  0.00 - 0.20 10*3/mm3 Final   ]

## 2020-06-16 RX ORDER — PANTOPRAZOLE SODIUM 40 MG/1
40 TABLET, DELAYED RELEASE ORAL DAILY
Qty: 90 TABLET | Refills: 0 | Status: SHIPPED | OUTPATIENT
Start: 2020-06-16 | End: 2020-09-01 | Stop reason: SDUPTHER

## 2020-06-16 RX ORDER — ESCITALOPRAM OXALATE 10 MG/1
10 TABLET ORAL DAILY
Qty: 90 TABLET | Refills: 0 | Status: SHIPPED | OUTPATIENT
Start: 2020-06-16 | End: 2020-09-01 | Stop reason: SDUPTHER

## 2020-08-25 ENCOUNTER — LAB (OUTPATIENT)
Dept: LAB | Facility: OTHER | Age: 60
End: 2020-08-25

## 2020-08-25 DIAGNOSIS — E78.2 MIXED HYPERLIPIDEMIA: Chronic | ICD-10-CM

## 2020-08-25 LAB
ALBUMIN SERPL-MCNC: 4 G/DL (ref 3.5–5)
ALBUMIN/GLOB SERPL: 1.4 G/DL (ref 1.1–1.8)
ALP SERPL-CCNC: 69 U/L (ref 38–126)
ALT SERPL W P-5'-P-CCNC: 21 U/L
ANION GAP SERPL CALCULATED.3IONS-SCNC: 2 MMOL/L (ref 5–15)
AST SERPL-CCNC: 29 U/L (ref 14–36)
BASOPHILS # BLD AUTO: 0.05 10*3/MM3 (ref 0–0.2)
BASOPHILS NFR BLD AUTO: 1 % (ref 0–1.5)
BILIRUB SERPL-MCNC: 0.4 MG/DL (ref 0.2–1.3)
BUN SERPL-MCNC: 11 MG/DL (ref 7–23)
BUN/CREAT SERPL: 16.7 (ref 7–25)
CALCIUM SPEC-SCNC: 8.8 MG/DL (ref 8.4–10.2)
CHLORIDE SERPL-SCNC: 106 MMOL/L (ref 101–112)
CHOLEST SERPL-MCNC: 207 MG/DL (ref 150–200)
CO2 SERPL-SCNC: 32 MMOL/L (ref 22–30)
CREAT SERPL-MCNC: 0.66 MG/DL (ref 0.52–1.04)
DEPRECATED RDW RBC AUTO: 45.9 FL (ref 37–54)
EOSINOPHIL # BLD AUTO: 0.14 10*3/MM3 (ref 0–0.4)
EOSINOPHIL NFR BLD AUTO: 2.7 % (ref 0.3–6.2)
ERYTHROCYTE [DISTWIDTH] IN BLOOD BY AUTOMATED COUNT: 14.1 % (ref 12.3–15.4)
GFR SERPL CREATININE-BSD FRML MDRD: 92 ML/MIN/1.73 (ref 51–120)
GLOBULIN UR ELPH-MCNC: 2.8 GM/DL (ref 2.3–3.5)
GLUCOSE SERPL-MCNC: 102 MG/DL (ref 70–99)
HCT VFR BLD AUTO: 44.3 % (ref 34–46.6)
HDLC SERPL-MCNC: 54 MG/DL (ref 40–59)
HGB BLD-MCNC: 14.4 G/DL (ref 12–15.9)
LDLC SERPL CALC-MCNC: 133 MG/DL
LDLC/HDLC SERPL: 2.46 {RATIO} (ref 0–3.22)
LYMPHOCYTES # BLD AUTO: 2.18 10*3/MM3 (ref 0.7–3.1)
LYMPHOCYTES NFR BLD AUTO: 42 % (ref 19.6–45.3)
MCH RBC QN AUTO: 29.4 PG (ref 26.6–33)
MCHC RBC AUTO-ENTMCNC: 32.5 G/DL (ref 31.5–35.7)
MCV RBC AUTO: 90.6 FL (ref 79–97)
MONOCYTES # BLD AUTO: 0.31 10*3/MM3 (ref 0.1–0.9)
MONOCYTES NFR BLD AUTO: 6 % (ref 5–12)
NEUTROPHILS NFR BLD AUTO: 2.51 10*3/MM3 (ref 1.7–7)
NEUTROPHILS NFR BLD AUTO: 48.3 % (ref 42.7–76)
PLATELET # BLD AUTO: 170 10*3/MM3 (ref 140–450)
PMV BLD AUTO: 9.8 FL (ref 6–12)
POTASSIUM SERPL-SCNC: 4.9 MMOL/L (ref 3.4–5)
PROT SERPL-MCNC: 6.8 G/DL (ref 6.3–8.6)
RBC # BLD AUTO: 4.89 10*6/MM3 (ref 3.77–5.28)
SODIUM SERPL-SCNC: 140 MMOL/L (ref 137–145)
TRIGL SERPL-MCNC: 100 MG/DL
TSH SERPL DL<=0.05 MIU/L-ACNC: 2.22 UIU/ML (ref 0.27–4.2)
VLDLC SERPL-MCNC: 20 MG/DL
WBC # BLD AUTO: 5.19 10*3/MM3 (ref 3.4–10.8)

## 2020-08-25 PROCEDURE — 36415 COLL VENOUS BLD VENIPUNCTURE: CPT | Performed by: INTERNAL MEDICINE

## 2020-08-25 PROCEDURE — 85025 COMPLETE CBC W/AUTO DIFF WBC: CPT | Performed by: INTERNAL MEDICINE

## 2020-08-25 PROCEDURE — 80061 LIPID PANEL: CPT | Performed by: INTERNAL MEDICINE

## 2020-08-25 PROCEDURE — 80053 COMPREHEN METABOLIC PANEL: CPT | Performed by: INTERNAL MEDICINE

## 2020-08-25 PROCEDURE — 84443 ASSAY THYROID STIM HORMONE: CPT | Performed by: INTERNAL MEDICINE

## 2020-09-01 ENCOUNTER — OFFICE VISIT (OUTPATIENT)
Dept: FAMILY MEDICINE CLINIC | Facility: CLINIC | Age: 60
End: 2020-09-01

## 2020-09-01 VITALS — WEIGHT: 124 LBS | HEIGHT: 63 IN | BODY MASS INDEX: 21.97 KG/M2

## 2020-09-01 DIAGNOSIS — Z72.0 TOBACCO ABUSE: Chronic | ICD-10-CM

## 2020-09-01 DIAGNOSIS — I87.2 CHRONIC VENOUS INSUFFICIENCY: Chronic | ICD-10-CM

## 2020-09-01 DIAGNOSIS — K21.00 GASTROESOPHAGEAL REFLUX DISEASE WITH ESOPHAGITIS: Chronic | ICD-10-CM

## 2020-09-01 DIAGNOSIS — G44.209 TENSION HEADACHE: Chronic | ICD-10-CM

## 2020-09-01 DIAGNOSIS — F41.1 GAD (GENERALIZED ANXIETY DISORDER): Chronic | ICD-10-CM

## 2020-09-01 DIAGNOSIS — Z12.2 ENCOUNTER FOR SCREENING FOR LUNG CANCER: ICD-10-CM

## 2020-09-01 DIAGNOSIS — E78.2 MIXED HYPERLIPIDEMIA: Primary | Chronic | ICD-10-CM

## 2020-09-01 DIAGNOSIS — F33.1 MAJOR DEPRESSIVE DISORDER, RECURRENT, MODERATE (HCC): Chronic | ICD-10-CM

## 2020-09-01 PROCEDURE — 99443 PR PHYS/QHP TELEPHONE EVALUATION 21-30 MIN: CPT | Performed by: INTERNAL MEDICINE

## 2020-09-01 RX ORDER — ESCITALOPRAM OXALATE 10 MG/1
10 TABLET ORAL DAILY
Qty: 90 TABLET | Refills: 3 | Status: SHIPPED | OUTPATIENT
Start: 2020-09-01 | End: 2020-12-14 | Stop reason: SDUPTHER

## 2020-09-01 RX ORDER — PANTOPRAZOLE SODIUM 40 MG/1
40 TABLET, DELAYED RELEASE ORAL DAILY
Qty: 90 TABLET | Refills: 3 | Status: SHIPPED | OUTPATIENT
Start: 2020-09-01 | End: 2020-12-14 | Stop reason: SDUPTHER

## 2020-09-01 RX ORDER — ASPIRIN 81 MG/1
81 TABLET, CHEWABLE ORAL DAILY
COMMUNITY

## 2020-09-01 NOTE — PROGRESS NOTES
Subjective      You have chosen to receive care through a telephone visit. Do you consent to use a telephone visit for your medical care today? Yes    Total visit time: 21 minutes.        History of Present Illness     Reyna Mayfield is a 60 y.o. female who receives care via telephone visit for annual follow up on multiple chronic conditions including GERD, MALISSA, major depressive disorder, mixed hyperlipidemia, tobacco abuse, and history of colon polyps.  She continues to work long hours at [employer] in [place].  She reports she is being able to adequately manage MALISSA and depression with Lexapro.  GERD symptoms adequately managed with Protonix.      She continues to struggle with chronic bilateral lower extremity edema due to chronic venous insufficiency, which waxes and wanes in severity, for which I recommended top of the calf support stockings with last year's exam.  Until today's annual visit, she has not reported any difficulty with this management strategy.  Today, she reports stable body weight and reports she has been wearing support stockings episodically, which she reports just seems to fit too tightly at times.  She has several different types of support stockings at home, but does not feel that any are working very well.  She continues to have extended hours of weightbearing each day with her work at the [employer].  Despite wearing the stockings and maintaining a goal weight, she continues to have painful edema from the knee down to the feet and ankles.  We reinforced the importance of sodium restriction as well.  She has taken Ibuprofen 800 mg with temporary improvement, but I explained the ibuprofen and other NSAIDs can actually increase the swelling, if used frequently.  I recommended referral to vascular surgery for further evaluation. She is in agreement.     She continues to smoke around a pack of cigarettes daily.  I advised the patient of the risks in continuing to use tobacco  "products. Patient is urged to stop smoking and never start again. Due to her tobacco abuse, I recommended CT lung cancer screening. She is in agreement.  We discussed tobacco cessation.  She declines Chantix, but wants to know how she might use electronic cigarettes to ultimately stop smoking.  We discussed that strategy for several minutes.    She is overdue mammogram/breast exam/GYN exam, for which she would like to see Nita Lara.  We will assist patient in making this appointment.        She is up to date on colonoscopy until 11/2022.     The patient's relevant past medical, surgical, and social history was reviewed in Epic.   Lab results are reviewed with the patient today.  CBC unremarkable.  Fasting glucose 102.  Normal renal and liver function.  Total cholesterol improved at 207.  HDL 54.  LDL also improved at 133.  Triglycerides 100.  LDL/HDL ratio 2.46.  Normal thyroid screen.       Review of Systems   Constitutional: Negative for chills, fatigue and fever.   HENT: Negative for congestion, ear pain, postnasal drip, sinus pressure and sore throat.    Respiratory: Negative for cough, shortness of breath and wheezing.    Cardiovascular: Positive for leg swelling. Negative for chest pain and palpitations.   Gastrointestinal: Negative for abdominal pain, blood in stool, constipation, diarrhea, nausea and vomiting.   Endocrine: Negative for cold intolerance, heat intolerance, polydipsia and polyuria.   Genitourinary: Negative for dysuria, frequency, hematuria and urgency.   Skin: Negative for rash.   Neurological: Negative for syncope and weakness.   Psychiatric/Behavioral: The patient is nervous/anxious.       Objective     Visit Vitals  Ht 160 cm (63\")   Wt 56.2 kg (124 lb)   LMP  (LMP Unknown)   BMI 21.97 kg/m²     Physical Exam        Assessment/Plan      Refer to Dr. Young, vascular surgery.   Recommended top of the calf support stockings, sodium restriction, maintaining a goal body weight, " and elevating the lower extremities when not ambulating to help manage edema due to chronic venous insufficiency and varicosities.     We will assist patient in scheduling appointment with Nita Lara for overdue routine GYN exam/pap smear, and mammogram.      Continue the Lexapro for MALISSA and depression, which seems stable.      An order is placed for patient to schedule screening CT of the lungs due to tobacco abuse.  I advised the patient of the risks in continuing to use tobacco products.  She declines Chantix, but wants to try electronic cigarettes.  We discussed how to use that strategy, although less effective than Chantix. Patient is urged to stop smoking and never start again.     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     Continue Protonix for GERD.      Cholesterol improved with dietary improvement.  Recommended patient decrease sugar and carbohydrates as well as high fat foods to delay progression of impaired fasting glucose and help further lower cholesterol/triglycerides.       Return in one year for annual exam with fasting labs one week prior.      Scribed for Dr. Cochran by Mihaela Calderón [facility].     Diagnoses and all orders for this visit:    Mixed hyperlipidemia  -     CBC Auto Differential; Future  -     Comprehensive Metabolic Panel; Future  -     Lipid Panel; Future  -     TSH; Future    Chronic venous insufficiency  -     Ambulatory Referral to Cardiothoracic Surgery    Gastroesophageal reflux disease with esophagitis    Tension headache    MALISSA (generalized anxiety disorder)    Major depressive disorder, recurrent, moderate (CMS/HCC)    tobacco abuse - 1ppd    Encounter for screening for lung cancer  -     CT Chest Low Dose Wo    Other orders  -     aspirin 81 MG chewable tablet; Chew 81 mg Daily.  -     escitalopram (LEXAPRO) 10 MG tablet; Take 1 tablet by mouth Daily.  -     pantoprazole  (PROTONIX) 40 MG EC tablet; Take 1 tablet by mouth Daily.        Lab on 08/25/2020   Component Date Value Ref Range Status   • Glucose 08/25/2020 102* 70 - 99 mg/dL Final   • BUN 08/25/2020 11  7 - 23 mg/dL Final   • Creatinine 08/25/2020 0.66  0.52 - 1.04 mg/dL Final   • Sodium 08/25/2020 140  137 - 145 mmol/L Final   • Potassium 08/25/2020 4.9  3.4 - 5.0 mmol/L Final   • Chloride 08/25/2020 106  101 - 112 mmol/L Final   • CO2 08/25/2020 32.0* 22.0 - 30.0 mmol/L Final   • Calcium 08/25/2020 8.8  8.4 - 10.2 mg/dL Final   • Total Protein 08/25/2020 6.8  6.3 - 8.6 g/dL Final   • Albumin 08/25/2020 4.00  3.50 - 5.00 g/dL Final   • ALT (SGPT) 08/25/2020 21  <=35 U/L Final   • AST (SGOT) 08/25/2020 29  14 - 36 U/L Final   • Alkaline Phosphatase 08/25/2020 69  38 - 126 U/L Final   • Total Bilirubin 08/25/2020 0.4  0.2 - 1.3 mg/dL Final   • eGFR Non African Amer 08/25/2020 92  51 - 120 mL/min/1.73 Final   • Globulin 08/25/2020 2.8  2.3 - 3.5 gm/dL Final   • A/G Ratio 08/25/2020 1.4  1.1 - 1.8 g/dL Final   • BUN/Creatinine Ratio 08/25/2020 16.7  7.0 - 25.0 Final   • Anion Gap 08/25/2020 2.0* 5.0 - 15.0 mmol/L Final   • WBC 08/25/2020 5.19  3.40 - 10.80 10*3/mm3 Final   • RBC 08/25/2020 4.89  3.77 - 5.28 10*6/mm3 Final   • Hemoglobin 08/25/2020 14.4  12.0 - 15.9 g/dL Final   • Hematocrit 08/25/2020 44.3  34.0 - 46.6 % Final   • MCV 08/25/2020 90.6  79.0 - 97.0 fL Final   • MCH 08/25/2020 29.4  26.6 - 33.0 pg Final   • MCHC 08/25/2020 32.5  31.5 - 35.7 g/dL Final   • RDW 08/25/2020 14.1  12.3 - 15.4 % Final   • RDW-SD 08/25/2020 45.9  37.0 - 54.0 fl Final   • MPV 08/25/2020 9.8  6.0 - 12.0 fL Final   • Platelets 08/25/2020 170  140 - 450 10*3/mm3 Final   • Neutrophil % 08/25/2020 48.3  42.7 - 76.0 % Final   • Lymphocyte % 08/25/2020 42.0  19.6 - 45.3 % Final   • Monocyte % 08/25/2020 6.0  5.0 - 12.0 % Final   • Eosinophil % 08/25/2020 2.7  0.3 - 6.2 % Final   • Basophil % 08/25/2020 1.0  0.0 - 1.5 % Final   • Neutrophils,  Absolute 08/25/2020 2.51  1.70 - 7.00 10*3/mm3 Final   • Lymphocytes, Absolute 08/25/2020 2.18  0.70 - 3.10 10*3/mm3 Final   • Monocytes, Absolute 08/25/2020 0.31  0.10 - 0.90 10*3/mm3 Final   • Eosinophils, Absolute 08/25/2020 0.14  0.00 - 0.40 10*3/mm3 Final   • Basophils, Absolute 08/25/2020 0.05  0.00 - 0.20 10*3/mm3 Final   • Total Cholesterol 08/25/2020 207* 150 - 200 mg/dL Final   • Triglycerides 08/25/2020 100  <=150 mg/dL Final   • HDL Cholesterol 08/25/2020 54  40 - 59 mg/dL Final   • LDL Cholesterol  08/25/2020 133* <=100 mg/dL Final   • VLDL Cholesterol 08/25/2020 20  mg/dL Final   • LDL/HDL Ratio 08/25/2020 2.46  0.00 - 3.22 Final   • TSH 08/25/2020 2.220  0.270 - 4.200 uIU/mL Final   ]

## 2020-09-01 NOTE — PATIENT INSTRUCTIONS
Chronic Venous Insufficiency  Chronic venous insufficiency is a condition where the leg veins cannot effectively pump blood from the legs to the heart. This happens when the vein walls are either stretched, weakened, or damaged, or when the valves inside the vein are damaged. With the right treatment, you should be able to continue with an active life. This condition is also called venous stasis.  What are the causes?  Common causes of this condition include:  · High blood pressure inside the veins (venous hypertension).  · Sitting or standing too long, causing increased blood pressure in the leg veins.  · A blood clot that blocks blood flow in a vein (deep vein thrombosis, DVT).  · Inflammation of a vein (phlebitis) that causes a blood clot to form.  · Tumors in the pelvis that cause blood to back up.  What increases the risk?  The following factors may make you more likely to develop this condition:  · Having a family history of this condition.  · Obesity.  · Pregnancy.  · Living without enough regular physical activity or exercise (sedentary lifestyle).  · Smoking.  · Having a job that requires long periods of standing or sitting in one place.  · Being a certain age. Women in their 40s and 50s and men in their 70s are more likely to develop this condition.  What are the signs or symptoms?  Symptoms of this condition include:  · Veins that are enlarged, bulging, or twisted (varicose veins).  · Skin breakdown or ulcers.  · Reddened skin or dark discoloration of skin on the leg between the knee and ankle.  · Brown, smooth, tight, and painful skin just above the ankle, usually on the inside of the leg (lipodermatosclerosis).  · Swelling of the legs.  How is this diagnosed?  This condition may be diagnosed based on:  · Your medical history.  · A physical exam.  · Tests, such as:  ? A procedure that creates an image of a blood vessel and nearby organs and provides information about blood flow through the blood vessel  (duplex ultrasound).  ? A procedure that tests blood flow (plethysmography).  ? A procedure that looks at the veins using X-ray and dye (venogram).  How is this treated?  The goals of treatment are to help you return to an active life and to minimize pain or disability. Treatment depends on the severity of your condition, and it may include:  · Wearing compression stockings. These can help relieve symptoms and help prevent your condition from getting worse. However, they do not cure the condition.  · Sclerotherapy. This procedure involves an injection of a solution that shrinks damaged veins.  · Surgery. This may involve:  ? Removing a diseased vein (vein stripping).  ? Cutting off blood flow through the vein (laser ablation surgery).  ? Repairing or reconstructing a valve within the affected vein.  Follow these instructions at home:         · Wear compression stockings as told by your health care provider. These stockings help to prevent blood clots and reduce swelling in your legs.  · Take over-the-counter and prescription medicines only as told by your health care provider.  · Stay active by exercising, walking, or doing different activities. Ask your health care provider what activities are safe for you and how much exercise you need.  · Drink enough fluid to keep your urine pale yellow.  · Do not use any products that contain nicotine or tobacco, such as cigarettes, e-cigarettes, and chewing tobacco. If you need help quitting, ask your health care provider.  · Keep all follow-up visits as told by your health care provider. This is important.  Contact a health care provider if you:  · Have redness, swelling, or more pain in the affected area.  · See a red streak or line that goes up or down from the affected area.  · Have skin breakdown or skin loss in the affected area, even if the breakdown is small.  · Get an injury in the affected area.  Get help right away if:  · You get an injury and an open wound in the  affected area.  · You have:  ? Severe pain that does not get better with medicine.  ? Sudden numbness or weakness in the foot or ankle below the affected area.  ? Trouble moving your foot or ankle.  ? A fever.  ? Worse or persistent symptoms.  ? Chest pain.  ? Shortness of breath.  Summary  · Chronic venous insufficiency is a condition where the leg veins cannot effectively pump blood from the legs to the heart.  · Chronic venous insufficiency occurs when the vein walls become stretched, weakened, or damaged, or when valves within the vein are damaged.  · Treatment depends on how severe your condition is. It often involves wearing compression stockings and may involve having a procedure.  · Make sure you stay active by exercising, walking, or doing different activities. Ask your health care provider what activities are safe for you and how much exercise you need.  This information is not intended to replace advice given to you by your health care provider. Make sure you discuss any questions you have with your health care provider.  Document Released: 04/22/2008 Document Revised: 09/10/2019 Document Reviewed: 09/10/2019  [employer] Patient Education © 2020 [employer]    Steps to Quit Smoking  Smoking tobacco is the leading cause of preventable death. It can affect almost every organ in the body. Smoking puts you and those around you at risk for developing many serious chronic diseases. Quitting smoking can be difficult, but it is one of the best things that you can do for your health. It is never too late to quit.  How do I get ready to quit?  When you decide to quit smoking, create a plan to help you succeed. Before you quit:  · Pick a date to quit. Set a date within the next 2 weeks to give you time to prepare.  · Write down the reasons why you are quitting. Keep this list in places where you will see it often.  · Tell your family, friends, and co-workers that you are quitting. Support from your loved ones can  make quitting easier.  · Talk with your health care provider about your options for quitting smoking.  · Find out what treatment options are covered by your health insurance.  · Identify people, places, things, and activities that make you want to smoke (triggers). Avoid them.  What first steps can I take to quit smoking?  · Throw away all cigarettes at home, at work, and in your car.  · Throw away smoking accessories, such as ashtrays and lighters.  · Clean your car. Make sure to empty the ashtray.  · Clean your home, including curtains and carpets.  What strategies can I use to quit smoking?  Talk with your health care provider about combining strategies, such as taking medicines while you are also receiving in-person counseling. Using these two strategies together makes you more likely to succeed in quitting than if you used either strategy on its own.  · If you are pregnant or breastfeeding, talk with your health care provider about finding counseling or other support strategies to quit smoking. Do not take medicine to help you quit smoking unless your health care provider tells you to do so.  To quit smoking:  Quit right away  · Quit smoking completely, instead of gradually reducing how much you smoke over a period of time. Research shows that stopping smoking right away is more successful than gradually quitting.  · Attend in-person counseling to help you build problem-solving skills. You are more likely to succeed in quitting if you attend counseling sessions regularly. Even short sessions of 10 minutes can be effective.  Take medicine  You may take medicines to help you quit smoking. Some medicines require a prescription and some you can purchase over-the-counter. Medicines may have nicotine in them to replace the nicotine in cigarettes. Medicines may:  · Help to stop cravings.  · Help to relieve withdrawal symptoms.  Your health care provider may recommend:  · Nicotine patches, gum, or lozenges.  · Nicotine  inhalers or sprays.  · Non-nicotine medicine that is taken by mouth.  Find resources  Find resources and support systems that can help you to quit smoking and remain smoke-free after you quit. These resources are most helpful when you use them often. They include:  · Online chats with a counselor.  · Telephone quitlines.  · Printed self-help materials.  · Support groups or group counseling.  · Text messaging programs.  · Mobile phone apps or applications. Use apps that can help you stick to your quit plan by providing reminders, tips, and encouragement. There are many free apps for mobile devices as well as websites. Examples include Quit Guide from the CDC and smokefree.gov  What things can I do to make it easier to quit?    · Reach out to your family and friends for support and encouragement. Call telephone quitlines (3-800-QUIT-NOW), reach out to support groups, or work with a counselor for support.  · Ask people who smoke to avoid smoking around you.  · Avoid places that trigger you to smoke, such as bars, parties, or smoke-break areas at work.  · Spend time with people who do not smoke.  · Lessen the stress in your life. Stress can be a smoking trigger for some people. To lessen stress, try:  ? Exercising regularly.  ? Doing deep-breathing exercises.  ? Doing yoga.  ? Meditating.  ? Performing a body scan. This involves closing your eyes, scanning your body from head to toe, and noticing which parts of your body are particularly tense. Try to relax the muscles in those areas.  How will I feel when I quit smoking?  Day 1 to 3 weeks  Within the first 24 hours of quitting smoking, you may start to feel withdrawal symptoms. These symptoms are usually most noticeable 2-3 days after quitting, but they usually do not last for more than 2-3 weeks. You may experience these symptoms:  · Mood swings.  · Restlessness, anxiety, or irritability.  · Trouble concentrating.  · Dizziness.  · Strong cravings for sugary foods and  nicotine.  · Mild weight gain.  · Constipation.  · Nausea.  · Coughing or a sore throat.  · Changes in how the medicines that you take for unrelated issues work in your body.  · Depression.  · Trouble sleeping (insomnia).  Week 3 and afterward  After the first 2-3 weeks of quitting, you may start to notice more positive results, such as:  · Improved sense of smell and taste.  · Decreased coughing and sore throat.  · Slower heart rate.  · Lower blood pressure.  · Clearer skin.  · The ability to breathe more easily.  · Fewer sick days.  Quitting smoking can be very challenging. Do not get discouraged if you are not successful the first time. Some people need to make many attempts to quit before they achieve long-term success. Do your best to stick to your quit plan, and talk with your health care provider if you have any questions or concerns.  Summary  · Smoking tobacco is the leading cause of preventable death. Quitting smoking is one of the best things that you can do for your health.  · When you decide to quit smoking, create a plan to help you succeed.  · Quit smoking right away, not slowly over a period of time.  · When you start quitting, seek help from your health care provider, family, or friends.  This information is not intended to replace advice given to you by your health care provider. Make sure you discuss any questions you have with your health care provider.  Document Released: 12/12/2002 Document Revised: 03/06/2020 Document Reviewed: 03/07/2020  [employer] Patient Education © 2020 [employer]

## 2020-09-02 DIAGNOSIS — M79.89 LEG SWELLING: Primary | ICD-10-CM

## 2020-10-07 ENCOUNTER — OFFICE VISIT (OUTPATIENT)
Dept: FAMILY MEDICINE CLINIC | Facility: CLINIC | Age: 60
End: 2020-10-07

## 2020-10-07 VITALS — BODY MASS INDEX: 21.97 KG/M2 | HEIGHT: 63 IN | WEIGHT: 124 LBS

## 2020-10-07 DIAGNOSIS — H66.90 ACUTE OTITIS MEDIA, UNSPECIFIED OTITIS MEDIA TYPE: Primary | ICD-10-CM

## 2020-10-07 DIAGNOSIS — H60.502 ACUTE OTITIS EXTERNA OF LEFT EAR, UNSPECIFIED TYPE: ICD-10-CM

## 2020-10-07 PROCEDURE — 99443 PR PHYS/QHP TELEPHONE EVALUATION 21-30 MIN: CPT | Performed by: INTERNAL MEDICINE

## 2020-10-07 RX ORDER — NEOMYCIN SULFATE, POLYMYXIN B SULFATE AND HYDROCORTISONE 10; 3.5; 1 MG/ML; MG/ML; [USP'U]/ML
3 SUSPENSION/ DROPS AURICULAR (OTIC) 3 TIMES DAILY
Qty: 5 ML | Refills: 0 | Status: SHIPPED | OUTPATIENT
Start: 2020-10-07 | End: 2020-10-14

## 2020-10-07 RX ORDER — AMOXICILLIN 500 MG/1
500 CAPSULE ORAL 4 TIMES DAILY
Qty: 32 CAPSULE | Refills: 0 | Status: SHIPPED | OUTPATIENT
Start: 2020-10-07 | End: 2020-10-15

## 2020-10-07 NOTE — PROGRESS NOTES
"Subjective      You have chosen to receive care through a telephone visit. Do you consent to use a telephone visit for your medical care today? Yes    Total visit time: 14 minutes.      History of Present Illness     Reyna Mayfield is a 60 y.o. female who reports 3-day history of left earache. She works at the [employer] in [place] where she is exposed to the public, but denies symptoms of Covid-19 including no fever or chills, cough, sore throat, anosmia, or ageusia.  She feels current symptoms are similar to prior right otitis, which resolved with amoxicillin and Cortisporin otic drops.  She tried using an ear candle without relief.  She denies other fall allergy symptoms.       Review of Systems   Constitutional: Negative for chills, fatigue and fever.   HENT: Positive for ear pain. Negative for congestion, postnasal drip, sinus pressure and sore throat.    Respiratory: Negative for cough, shortness of breath and wheezing.    Cardiovascular: Negative for chest pain, palpitations and leg swelling.   Gastrointestinal: Negative for abdominal pain, blood in stool, constipation, diarrhea, nausea and vomiting.   Endocrine: Negative for cold intolerance, heat intolerance, polydipsia and polyuria.   Genitourinary: Negative for dysuria, frequency, hematuria and urgency.   Skin: Negative for rash.   Neurological: Negative for syncope and weakness.      Objective      Visit Vitals  Ht 160 cm (63\")   Wt 56.2 kg (124 lb)   LMP  (LMP Unknown)   BMI 21.97 kg/m²     Physical Exam     Future Appointments   Date Time Provider Department [place]   10/19/2020 11:00 AM [facility]   10/19/2020  1:30 PM [place] [facility] [place] [facility] [place]   9/8/2021  1:00 PM [place]itin Cochran [place] [facility] [place] [place] [facility] None       Assessment/Plan      For apparent left otitis media/externa, a prescription is sent for Amoxil 500 mg to take one q.i.d. x 8 days and cortisporin otic to administer 3 drops into the left ear 3 " (Three) t.i.d. for 7 days.  Notify me if pain persists and we will schedule in person office visit.      Return in September 2021 for next annual routine exam with fasting labs one week prior or sooner if needed.      Scribed for Dr. Cochran by Mihaela Calderón [facility].     Diagnoses and all orders for this visit:    Acute otitis media, unspecified otitis media type    Acute otitis externa of left ear, unspecified type    Other orders  -     amoxicillin (AMOXIL) 500 MG capsule; Take 1 capsule by mouth 4 (Four) Times a Day for 8 days.  -     neomycin-polymyxin-hydrocortisone (CORTISPORIN) 3.5-61875-8 otic suspension; Administer 3 drops into the left ear 3 (Three) Times a Day for 7 days.        No visits with results within 3 Week(s) from this visit.   Latest known visit with results is:   Lab on 08/25/2020   Component Date Value Ref Range Status   • Glucose 08/25/2020 102* 70 - 99 mg/dL Final   • BUN 08/25/2020 11  7 - 23 mg/dL Final   • Creatinine 08/25/2020 0.66  0.52 - 1.04 mg/dL Final   • Sodium 08/25/2020 140  137 - 145 mmol/L Final   • Potassium 08/25/2020 4.9  3.4 - 5.0 mmol/L Final   • Chloride 08/25/2020 106  101 - 112 mmol/L Final   • CO2 08/25/2020 32.0* 22.0 - 30.0 mmol/L Final   • Calcium 08/25/2020 8.8  8.4 - 10.2 mg/dL Final   • Total Protein 08/25/2020 6.8  6.3 - 8.6 g/dL Final   • Albumin 08/25/2020 4.00  3.50 - 5.00 g/dL Final   • ALT (SGPT) 08/25/2020 21  <=35 U/L Final   • AST (SGOT) 08/25/2020 29  14 - 36 U/L Final   • Alkaline Phosphatase 08/25/2020 69  38 - 126 U/L Final   • Total Bilirubin 08/25/2020 0.4  0.2 - 1.3 mg/dL Final   • eGFR Non African Amer 08/25/2020 92  51 - 120 mL/min/1.73 Final   • Globulin 08/25/2020 2.8  2.3 - 3.5 gm/dL Final   • A/G Ratio 08/25/2020 1.4  1.1 - 1.8 g/dL Final   • BUN/Creatinine Ratio 08/25/2020 16.7  7.0 - 25.0 Final   • Anion Gap 08/25/2020 2.0* 5.0 - 15.0 mmol/L Final   • WBC 08/25/2020 5.19  3.40 - 10.80 10*3/mm3 Final   • RBC 08/25/2020 4.89  3.77 - 5.28  10*6/mm3 Final   • Hemoglobin 08/25/2020 14.4  12.0 - 15.9 g/dL Final   • Hematocrit 08/25/2020 44.3  34.0 - 46.6 % Final   • MCV 08/25/2020 90.6  79.0 - 97.0 fL Final   • MCH 08/25/2020 29.4  26.6 - 33.0 pg Final   • MCHC 08/25/2020 32.5  31.5 - 35.7 g/dL Final   • RDW 08/25/2020 14.1  12.3 - 15.4 % Final   • RDW-SD 08/25/2020 45.9  37.0 - 54.0 fl Final   • MPV 08/25/2020 9.8  6.0 - 12.0 fL Final   • Platelets 08/25/2020 170  140 - 450 10*3/mm3 Final   • Neutrophil % 08/25/2020 48.3  42.7 - 76.0 % Final   • Lymphocyte % 08/25/2020 42.0  19.6 - 45.3 % Final   • Monocyte % 08/25/2020 6.0  5.0 - 12.0 % Final   • Eosinophil % 08/25/2020 2.7  0.3 - 6.2 % Final   • Basophil % 08/25/2020 1.0  0.0 - 1.5 % Final   • Neutrophils, Absolute 08/25/2020 2.51  1.70 - 7.00 10*3/mm3 Final   • Lymphocytes, Absolute 08/25/2020 2.18  0.70 - 3.10 10*3/mm3 Final   • Monocytes, Absolute 08/25/2020 0.31  0.10 - 0.90 10*3/mm3 Final   • Eosinophils, Absolute 08/25/2020 0.14  0.00 - 0.40 10*3/mm3 Final   • Basophils, Absolute 08/25/2020 0.05  0.00 - 0.20 10*3/mm3 Final   • Total Cholesterol 08/25/2020 207* 150 - 200 mg/dL Final   • Triglycerides 08/25/2020 100  <=150 mg/dL Final   • HDL Cholesterol 08/25/2020 54  40 - 59 mg/dL Final   • LDL Cholesterol  08/25/2020 133* <=100 mg/dL Final   • VLDL Cholesterol 08/25/2020 20  mg/dL Final   • LDL/HDL Ratio 08/25/2020 2.46  0.00 - 3.22 Final   • TSH 08/25/2020 2.220  0.270 - 4.200 uIU/mL Final   ]

## 2020-10-19 ENCOUNTER — OFFICE VISIT (OUTPATIENT)
Dept: CARDIAC SURGERY | Facility: CLINIC | Age: 60
End: 2020-10-19

## 2020-10-19 VITALS
DIASTOLIC BLOOD PRESSURE: 64 MMHG | HEIGHT: 63 IN | HEART RATE: 57 BPM | SYSTOLIC BLOOD PRESSURE: 96 MMHG | OXYGEN SATURATION: 98 % | WEIGHT: 126.6 LBS | BODY MASS INDEX: 22.43 KG/M2

## 2020-10-19 DIAGNOSIS — E78.2 MIXED HYPERLIPIDEMIA: Primary | Chronic | ICD-10-CM

## 2020-10-19 DIAGNOSIS — F33.1 MAJOR DEPRESSIVE DISORDER, RECURRENT, MODERATE (HCC): Chronic | ICD-10-CM

## 2020-10-19 DIAGNOSIS — F41.1 GAD (GENERALIZED ANXIETY DISORDER): Chronic | ICD-10-CM

## 2020-10-19 DIAGNOSIS — F17.218 NICOTINE DEPENDENCE, CIGARETTES, WITH OTHER NICOTINE-INDUCED DISORDERS: ICD-10-CM

## 2020-10-19 DIAGNOSIS — I87.2 CHRONIC VENOUS INSUFFICIENCY: Chronic | ICD-10-CM

## 2020-10-19 PROCEDURE — 99204 OFFICE O/P NEW MOD 45 MIN: CPT | Performed by: THORACIC SURGERY (CARDIOTHORACIC VASCULAR SURGERY)

## 2020-10-20 RX ORDER — VARENICLINE TARTRATE 0.5 MG/1
TABLET, FILM COATED ORAL
Qty: 60 TABLET | Refills: 3 | Status: SHIPPED | OUTPATIENT
Start: 2020-10-20 | End: 2020-12-14 | Stop reason: SDUPTHER

## 2020-10-20 NOTE — TELEPHONE ENCOUNTER
Relayed the results and she does want to quit smoking. Chantix called to pharmacy. TP          ----- Message from Nitin Cochran MD sent at 10/20/2020  3:42 PM CDT -----  Please inform Reyna that the CT scan of the chest reveals no evidence of lung cancer, but it does reveal emphysema changes and even some scarring at the tops of the lungs.  She really needs to stop smoking completely.  I will prescribe tobacco cessation aids if/when she is ready to try to stop.  EB

## 2020-10-27 ENCOUNTER — PRIOR AUTHORIZATION (OUTPATIENT)
Dept: FAMILY MEDICINE CLINIC | Facility: CLINIC | Age: 60
End: 2020-10-27

## 2020-11-02 PROBLEM — F17.218 NICOTINE DEPENDENCE, CIGARETTES, WITH OTHER NICOTINE-INDUCED DISORDERS: Status: ACTIVE | Noted: 2020-11-02

## 2020-11-02 NOTE — PROGRESS NOTES
10/19/2020    Reyna Mayfield  1960    Chief Complaint:    Chief Complaint   Patient presents with   • Chronic Venous Insufficiency       HPI:      PCP:  Nitin Cochran MD    60 y.o. female with Hyperlipidemia(stable, increased risk cardiovascular events), Smoker(uncontrolled, increased risk cardiovascular events) and COPD(stable, increased risk pulmonary complications), chronic venous insufficiency(new, increase risk venous stasis).  smokes 1 PPD.  Moderate leg pain when standing at work x 6 months.  Has worn compression stockings with minimal relief.  No TIA stroke amaurosis.  No MI claudication. No other associated signs, symptoms or modifying factors.    10/2020 Lower extremity venous duplex:  RIGHT no dvt, no reflux.  LEFT no dvt, no reflux.  Pulsatile venous flow.    The following portions of the patient's history were reviewed and updated as appropriate: allergies, current medications, past family history, past medical history, past social history, past surgical history and problem list.  Recent images independently reviewed.  Available laboratory values reviewed.    PMH:  Past Medical History:   Diagnosis Date   • Drug withdrawal (CMS/[facility])    • Functional diarrhea 9/19/2017    Added automatically from request for surgery [id number]   • MALISSA (generalized anxiety disorder)    • Gastroesophageal reflux disease with esophagitis    • GERD (gastroesophageal reflux disease)    • History of colonic polyps    • Insomnia    • Major depressive disorder, recurrent, moderate (CMS/HCC)    • Mixed hyperlipidemia 8/1/2018   • Pain in pelvis     Discomfort more than pain      • Pelvic floor relaxation    • Sleep disorder 2/28/2018   • Tobacco abuse 7/18/2017     Past Surgical History:   Procedure Laterality Date   • COLONOSCOPY  2011    yearly due to polyps   • COLONOSCOPY N/A 11/10/2017    Procedure: COLONOSCOPY;  Surgeon: Power Harris MD;  Location: [facility] ENDOSCOPY;  Service:    • ENDOSCOPY N/A 11/10/2017     Procedure: ESOPHAGOGASTRODUODENOSCOPY (u/s prior @1045) ;  Surgeon: Power Harris MD;  Location: [facility] ENDOSCOPY;  Service:    • ESOPHAGUS SURGERY     • LAPAROSCOPIC CHOLECYSTECTOMY      @ 23 y/o   • OTHER SURGICAL HISTORY  02/26/2014    Remove Impacted Cerumen 59863 (1)      • PAP SMEAR  02/03/2015    normal   • SHOULDER SURGERY Left 2008   • TUBAL ABDOMINAL LIGATION  2006   • UPPER GASTROINTESTINAL ENDOSCOPY  11/10/2017     Family History   Problem Relation Age of Onset   • Diabetes Mother    • Heart disease Mother    • Diabetes Father    • Osteoporosis Sister    • Colon cancer Neg Hx    • Stomach cancer Neg Hx      Social History     Tobacco Use   • Smoking status: Current Every Day Smoker     Packs/day: 1.00     Years: 35.00     Pack years: 35.00   • Smokeless tobacco: Never Used   Substance Use Topics   • Alcohol use: No   • Drug use: No       ALLERGIES:  No Known Allergies      MEDICATIONS:    Current Outpatient Medications:   •  aspirin 81 MG chewable tablet, Chew 81 mg Daily., Disp: , Rfl:   •  escitalopram (LEXAPRO) 10 MG tablet, Take 1 tablet by mouth Daily., Disp: 90 tablet, Rfl: 3  •  Multiple Vitamins-Calcium (ONE-A-DAY WOMENS PO), Take  by mouth Daily., Disp: , Rfl:   •  pantoprazole (PROTONIX) 40 MG EC tablet, Take 1 tablet by mouth Daily., Disp: 90 tablet, Rfl: 3  •  meclizine (ANTIVERT) 25 MG tablet, Take 1 tablet by mouth 3 (Three) Times a Day As Needed for dizziness., Disp: 60 tablet, Rfl: 2  •  ondansetron ODT (ZOFRAN-ODT) 4 MG disintegrating tablet, Take 4 mg by mouth Every 8 (Eight) Hours As Needed for Nausea., Disp: , Rfl: 0  •  varenicline (Chantix) 0.5 MG tablet, 1/2 to 1 bid, Disp: 60 tablet, Rfl: 3    Review of Systems   Review of Systems   Constitution: Negative for malaise/fatigue, night sweats and weight loss.   HENT: Negative for hearing loss, hoarse voice and stridor.    Eyes: Negative for vision loss in left eye, vision loss in right eye and visual disturbance.   Cardiovascular:  "Positive for leg swelling. Negative for chest pain and palpitations.   Respiratory: Negative for cough, hemoptysis and shortness of breath.    Hematologic/Lymphatic: Negative for adenopathy and bleeding problem. Does not bruise/bleed easily.   Skin: Negative for color change, poor wound healing and rash.   Musculoskeletal: Positive for myalgias. Negative for arthritis, back pain, muscle weakness and neck pain.   Gastrointestinal: Negative for abdominal pain, dysphagia and heartburn.   Neurological: Negative for dizziness, numbness and seizures.   Psychiatric/Behavioral: Negative for altered mental status, depression and memory loss. The patient is nervous/anxious.        Physical Exam   Vitals:    10/19/20 1303   BP: 96/64   BP Location: Left arm   Pulse: 57   SpO2: 98%   Weight: 57.4 kg (126 lb 9.6 oz)   Height: 160 cm (63\")     Physical Exam  Constitutional:       General: She is not in acute distress.     Appearance: She is not ill-appearing.   HENT:      Head: Atraumatic.      Right Ear: Hearing normal.      Left Ear: Hearing normal.      Nose: No nasal deformity.      Mouth/Throat:      Dentition: Normal dentition. Does not have dentures.   Eyes:      General: Lids are normal.      Conjunctiva/sclera: Conjunctivae normal.      Pupils:      Right eye: Pupil is round and reactive.      Left eye: Pupil is round and reactive.   Neck:      Thyroid: No thyroid mass or thyromegaly.      Vascular: No carotid bruit or JVD.      Trachea: No tracheal deviation.   Cardiovascular:      Rate and Rhythm: Normal rate and regular rhythm.      Pulses:           Carotid pulses are 2+ on the right side and 2+ on the left side.       Radial pulses are 2+ on the right side and 2+ on the left side.        Dorsalis pedis pulses are 2+ on the right side and 2+ on the left side.        Posterior tibial pulses are 2+ on the right side and 2+ on the left side.      Heart sounds: No murmur.      Comments: Trace edema bilateral " legs  Pulmonary:      Effort: Pulmonary effort is normal.      Breath sounds: Normal breath sounds.   Abdominal:      General: There is no distension.      Palpations: Abdomen is soft. There is no hepatomegaly, splenomegaly or mass.      Tenderness: There is no abdominal tenderness.   Musculoskeletal:         General: No deformity.      Comments: Gait normal.    Lymphadenopathy:      Cervical: No cervical adenopathy.      Upper Body:      Right upper body: No supraclavicular adenopathy.      Left upper body: No supraclavicular adenopathy.   Skin:     General: Skin is warm and dry.      Coloration: Skin is not pale.      Findings: No erythema.      Comments: No venous staining   Neurological:      Mental Status: She is alert and oriented to person, place, and time.   Psychiatric:         Speech: Speech normal.         Behavior: Behavior is cooperative.         Thought Content: Thought content normal.         Judgment: Judgment normal.         BUN   Date Value Ref Range Status   08/25/2020 11 7 - 23 mg/dL Final     Creatinine   Date Value Ref Range Status   08/25/2020 0.66 0.52 - 1.04 mg/dL Final     eGFR Non  Amer   Date Value Ref Range Status   08/25/2020 92 51 - 120 mL/min/1.73 Final       ASSESSMENT:  Diagnoses and all orders for this visit:    1. Mixed hyperlipidemia (Primary)    2. Chronic venous insufficiency    3. Major depressive disorder, recurrent, moderate (CMS/HCC)    4. MALISSA (generalized anxiety disorder)    5. Nicotine dependence, cigarettes, with other nicotine-induced disorders      PLAN:  Detailed discussion with Reyna Mayfield regarding situation and options.  Leg pain, swelling.  Normal arterial perfusion bilateral lower extremities.  No dvt or venous reflux.  Multiple risk factors with severe comorbidities.  No intervention indicated at this time.  Risks, benefits discussed.  Understands and wishes to proceed with plan.    Compression stockings for comfort.    Return as needed  Smoking  cessation advised and assistance options offered including behavioral counseling ([employer] [facility] Cessation [facility]), Nicotine replacement therapy (patches or gum), pharmacologic therapy (Chantix, Wellbutrin). patient understands that continued use of tobacco products increases risk of heart disease, stroke, cancer; counseling for 3-5min.  Recommended regular physical activity, progressive walking program.  Continue current medications as directed.  Will Obtain relevant old records.    Thank you for the opportunity to participate in this patient's care.    Copy to primary care provider.    EMR Dragon/Transcription disclaimer:   Much of this encounter note is an electronic transcription/translation of spoken language to printed text. The electronic translation of spoken language may permit erroneous, or at times, nonsensical words or phrases to be inadvertently transcribed; Although I have reviewed the note for such errors, some may still exist.

## 2020-12-14 RX ORDER — ESCITALOPRAM OXALATE 10 MG/1
10 TABLET ORAL DAILY
Qty: 90 TABLET | Refills: 3 | Status: SHIPPED | OUTPATIENT
Start: 2020-12-14 | End: 2022-02-11 | Stop reason: SDUPTHER

## 2020-12-14 RX ORDER — VARENICLINE TARTRATE 0.5 MG/1
TABLET, FILM COATED ORAL
Qty: 180 TABLET | Refills: 3 | Status: SHIPPED | OUTPATIENT
Start: 2020-12-14 | End: 2022-02-11

## 2020-12-14 RX ORDER — PANTOPRAZOLE SODIUM 40 MG/1
40 TABLET, DELAYED RELEASE ORAL DAILY
Qty: 90 TABLET | Refills: 3 | Status: SHIPPED | OUTPATIENT
Start: 2020-12-14 | End: 2022-02-11 | Stop reason: SDUPTHER

## 2021-02-22 DIAGNOSIS — M79.641 RIGHT HAND PAIN: Primary | ICD-10-CM

## 2021-02-22 DIAGNOSIS — R60.0 LOCALIZED EDEMA: ICD-10-CM

## 2021-02-23 ENCOUNTER — OFFICE VISIT (OUTPATIENT)
Dept: FAMILY MEDICINE CLINIC | Facility: CLINIC | Age: 61
End: 2021-02-23

## 2021-02-23 VITALS — BODY MASS INDEX: 22.32 KG/M2 | HEIGHT: 63 IN | WEIGHT: 126 LBS

## 2021-02-23 DIAGNOSIS — R23.0 CYANOSIS: ICD-10-CM

## 2021-02-23 DIAGNOSIS — I87.2 CHRONIC VENOUS INSUFFICIENCY: Chronic | ICD-10-CM

## 2021-02-23 DIAGNOSIS — M25.441 FINGER JOINT SWELLING, RIGHT: ICD-10-CM

## 2021-02-23 DIAGNOSIS — M25.531 WRIST PAIN, ACUTE, RIGHT: ICD-10-CM

## 2021-02-23 DIAGNOSIS — M19.90 INFLAMMATORY ARTHRITIS: Primary | ICD-10-CM

## 2021-02-23 DIAGNOSIS — M79.641 RIGHT HAND PAIN: ICD-10-CM

## 2021-02-23 DIAGNOSIS — Z72.0 TOBACCO ABUSE: Chronic | ICD-10-CM

## 2021-02-23 PROCEDURE — 99443 PR PHYS/QHP TELEPHONE EVALUATION 21-30 MIN: CPT | Performed by: INTERNAL MEDICINE

## 2021-02-23 RX ORDER — PREDNISONE 10 MG/1
TABLET ORAL
Qty: 24 TABLET | Refills: 0 | Status: SHIPPED | OUTPATIENT
Start: 2021-02-23 | End: 2021-06-25

## 2021-02-23 RX ORDER — MELOXICAM 15 MG/1
15 TABLET ORAL DAILY PRN
Qty: 30 TABLET | Refills: 0 | Status: SHIPPED | OUTPATIENT
Start: 2021-02-23 | End: 2021-06-25

## 2021-02-23 NOTE — PATIENT INSTRUCTIONS
Steps to Quit Smoking  Smoking tobacco is the leading cause of preventable death. It can affect almost every organ in the body. Smoking puts you and those around you at risk for developing many serious chronic diseases. Quitting smoking can be difficult, but it is one of the best things that you can do for your health. It is never too late to quit.  How do I get ready to quit?  When you decide to quit smoking, create a plan to help you succeed. Before you quit:  · Pick a date to quit. Set a date within the next 2 weeks to give you time to prepare.  · Write down the reasons why you are quitting. Keep this list in places where you will see it often.  · Tell your family, friends, and co-workers that you are quitting. Support from your loved ones can make quitting easier.  · Talk with your health care provider about your options for quitting smoking.  · Find out what treatment options are covered by your health insurance.  · Identify people, places, things, and activities that make you want to smoke (triggers). Avoid them.  What first steps can I take to quit smoking?  · Throw away all cigarettes at home, at work, and in your car.  · Throw away smoking accessories, such as ashtrays and lighters.  · Clean your car. Make sure to empty the ashtray.  · Clean your home, including curtains and carpets.  What strategies can I use to quit smoking?  Talk with your health care provider about combining strategies, such as taking medicines while you are also receiving in-person counseling. Using these two strategies together makes you more likely to succeed in quitting than if you used either strategy on its own.  · If you are pregnant or breastfeeding, talk with your health care provider about finding counseling or other support strategies to quit smoking. Do not take medicine to help you quit smoking unless your health care provider tells you to do so.  To quit smoking:  Quit right away  · Quit smoking completely, instead of  gradually reducing how much you smoke over a period of time. Research shows that stopping smoking right away is more successful than gradually quitting.  · Attend in-person counseling to help you build problem-solving skills. You are more likely to succeed in quitting if you attend counseling sessions regularly. Even short sessions of 10 minutes can be effective.  Take medicine  You may take medicines to help you quit smoking. Some medicines require a prescription and some you can purchase over-the-counter. Medicines may have nicotine in them to replace the nicotine in cigarettes. Medicines may:  · Help to stop cravings.  · Help to relieve withdrawal symptoms.  Your health care provider may recommend:  · Nicotine patches, gum, or lozenges.  · Nicotine inhalers or sprays.  · Non-nicotine medicine that is taken by mouth.  Find resources  Find resources and support systems that can help you to quit smoking and remain smoke-free after you quit. These resources are most helpful when you use them often. They include:  · Online chats with a counselor.  · Telephone quitlines.  · Printed self-help materials.  · Support groups or group counseling.  · Text messaging programs.  · Mobile phone apps or applications. Use apps that can help you stick to your quit plan by providing reminders, tips, and encouragement. There are many free apps for mobile devices as well as websites. Examples include Quit Guide from the CDC and smokefree.gov  What things can I do to make it easier to quit?    · Reach out to your family and friends for support and encouragement. Call telephone quitlines (7-658-QUIT-NOW), reach out to support groups, or work with a counselor for support.  · Ask people who smoke to avoid smoking around you.  · Avoid places that trigger you to smoke, such as bars, parties, or smoke-break areas at work.  · Spend time with people who do not smoke.  · Lessen the stress in your life. Stress can be a smoking trigger for some  people. To lessen stress, try:  ? Exercising regularly.  ? Doing deep-breathing exercises.  ? Doing yoga.  ? Meditating.  ? Performing a body scan. This involves closing your eyes, scanning your body from head to toe, and noticing which parts of your body are particularly tense. Try to relax the muscles in those areas.  How will I feel when I quit smoking?  Day 1 to 3 weeks  Within the first 24 hours of quitting smoking, you may start to feel withdrawal symptoms. These symptoms are usually most noticeable 2-3 days after quitting, but they usually do not last for more than 2-3 weeks. You may experience these symptoms:  · Mood swings.  · Restlessness, anxiety, or irritability.  · Trouble concentrating.  · Dizziness.  · Strong cravings for sugary foods and nicotine.  · Mild weight gain.  · Constipation.  · Nausea.  · Coughing or a sore throat.  · Changes in how the medicines that you take for unrelated issues work in your body.  · Depression.  · Trouble sleeping (insomnia).  Week 3 and afterward  After the first 2-3 weeks of quitting, you may start to notice more positive results, such as:  · Improved sense of smell and taste.  · Decreased coughing and sore throat.  · Slower heart rate.  · Lower blood pressure.  · Clearer skin.  · The ability to breathe more easily.  · Fewer sick days.  Quitting smoking can be very challenging. Do not get discouraged if you are not successful the first time. Some people need to make many attempts to quit before they achieve long-term success. Do your best to stick to your quit plan, and talk with your health care provider if you have any questions or concerns.  Summary  · Smoking tobacco is the leading cause of preventable death. Quitting smoking is one of the best things that you can do for your health.  · When you decide to quit smoking, create a plan to help you succeed.  · Quit smoking right away, not slowly over a period of time.  · When you start quitting, seek help from your  health care provider, family, or friends.  This information is not intended to replace advice given to you by your health care provider. Make sure you discuss any questions you have with your health care provider.  Document Revised: 09/11/2020 Document Reviewed: 03/07/2020  Elsevier Patient Education © 2020 [employer]

## 2021-02-23 NOTE — PROGRESS NOTES
"You have chosen to receive care through a telephone visit. Do you consent to use a telephone visit for your medical care today? Yes    Total visit time: 31 minutes.     Chief Complaint  Hand Pain (right and fingers x 3 weeks and some edema. Hurts when she works. States her hands are blue and the xray tech also noticed )    Subjective          History of Present Illness     Reyna Mayfield receives care via telephone visit through [facility] reporting 3-week history of pain and swelling of the fingers of right hand with bluish discoloration waxing and waning. She has not taken anything for the pain. She has pain in the right wrist occasionally.  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  X-ray of right hand this morning was read as normal with no acute fracture or dislocation. Labs last fall did not reveal polycythemia.     She continues to smoke around a pack of cigarettes daily.  I advised the patient of the risks in continuing to use tobacco products. I sent a prescription for Chantix last fall and states she is motivated and is encouraged to pick a stop date soon.  CT scan of the chest 10/2020 revealed no evidence of lung cancer, but it did reveal emphysema changes.      Objective   Vital Signs:     Ht 160 cm (63\")   Wt 57.2 kg (126 lb)   BMI 22.32 kg/m²         Physical Exam   Result Review :     CMP    CMP 8/25/20   Glucose 102 (A)   BUN 11   Creatinine 0.66   eGFR Non African Am 92   Sodium 140   Potassium 4.9   Chloride 106   Calcium 8.8   Albumin 4.00   Total Bilirubin 0.4   Alkaline Phosphatase 69   AST (SGOT) 29   ALT (SGPT) 21   (A) Abnormal value            CBC w/diff    CBC w/Diff 8/25/20   WBC 5.19   RBC 4.89   Hemoglobin 14.4   Hematocrit 44.3   MCV 90.6   MCH 29.4   MCHC 32.5   RDW 14.1   Platelets 170   Neutrophil Rel % 48.3   Lymphocyte Rel % 42.0   Monocyte Rel % 6.0   Eosinophil Rel % 2.7   Basophil Rel % " 1.0           Renal Profile    Renal Profile 8/25/20   BUN 11   Creatinine 0.66   eGFR Non  Am 92           Data reviewed: Consultant notes vascular clinic notes       Future Appointments   Date Time Provider Department Center   9/8/2021  1:00 PM Nitin Cochran MD [facility]        Assessment and Plan    Diagnoses and all orders for this visit:    1. Inflammatory arthritis (Primary)    2. Finger joint swelling, right    3. Right hand pain    4. tobacco abuse - 1ppd    5. Cyanosis    6. Chronic venous insufficiency    7. Wrist pain, acute, right    Other orders  -     meloxicam (MOBIC) 15 MG tablet; Take 1 tablet by mouth Daily As Needed (pain). Take with food  Dispense: 30 tablet; Refill: 0  -     predniSONE (DELTASONE) 10 MG tablet; 1 by mouth 3 times a day times 4 days, then 1 by mouth twice a day times 4 days, then 1 by mouth daily times 4 days  Dispense: 24 tablet; Refill: 0           For the pain and possible inflammatory changes of right hand/fingers/wrist,  I sent prescriptions for Mobic 15 mg q.d. and prednisone  taper to take as directed. Notify us if no improvement and  we will schedule an in person visit.     I advised the patient of the risks in continuing to use tobacco products. Patient is urged to take the Chantix 1/2 tablet daily and pick a stop date.  She voices motivation to stop.      Some of the issues with the blue appearance of the hand could be cyanosis related to the persistent tobacco abuse, or due to her chronic venous insufficiency, which has typically been more problematic with the lower extremities.  I do not think she is having issues with polycythemia, as her CBC was unremarkable 6 months ago, but if she continues to have issues, I would want to recheck a CBC.      Return 09/2021 for routine follow up with fasting labs one week prior.     Scribed for Dr. Cochran by Mihaela Calderón [facility].     Follow Up   Return if symptoms worsen or fail to improve, for Next scheduled follow  up.  Patient was given instructions and counseling regarding her condition or for health maintenance advice. Please see specific information pulled into the AVS if appropriate.

## 2021-06-25 ENCOUNTER — LAB (OUTPATIENT)
Dept: LAB | Facility: OTHER | Age: 61
End: 2021-06-25

## 2021-06-25 ENCOUNTER — OFFICE VISIT (OUTPATIENT)
Dept: OBSTETRICS AND GYNECOLOGY | Facility: CLINIC | Age: 61
End: 2021-06-25

## 2021-06-25 VITALS
HEIGHT: 63 IN | SYSTOLIC BLOOD PRESSURE: 100 MMHG | WEIGHT: 124.6 LBS | DIASTOLIC BLOOD PRESSURE: 60 MMHG | HEART RATE: 77 BPM | BODY MASS INDEX: 22.08 KG/M2

## 2021-06-25 DIAGNOSIS — Z12.31 SCREENING MAMMOGRAM, ENCOUNTER FOR: ICD-10-CM

## 2021-06-25 DIAGNOSIS — Z01.419 ENCOUNTER FOR GYNECOLOGICAL EXAMINATION WITH PAPANICOLAOU SMEAR OF CERVIX: Primary | ICD-10-CM

## 2021-06-25 DIAGNOSIS — R39.15 URINARY URGENCY: ICD-10-CM

## 2021-06-25 DIAGNOSIS — N81.4 CYSTOCELE WITH PROLAPSE: ICD-10-CM

## 2021-06-25 LAB
BILIRUB BLD-MCNC: NEGATIVE MG/DL
CLARITY, POC: CLEAR
COLOR UR: ABNORMAL
GLUCOSE UR STRIP-MCNC: NEGATIVE MG/DL
KETONES UR QL: NEGATIVE
LEUKOCYTE EST, POC: NEGATIVE
NITRITE UR-MCNC: NEGATIVE MG/ML
PH UR: 6.5 [PH] (ref 5–8)
PROT UR STRIP-MCNC: NEGATIVE MG/DL
RBC # UR STRIP: ABNORMAL /UL
SP GR UR: 1 (ref 1–1.03)
UROBILINOGEN UR QL: NORMAL

## 2021-06-25 PROCEDURE — 99213 OFFICE O/P EST LOW 20 MIN: CPT | Performed by: NURSE PRACTITIONER

## 2021-06-25 PROCEDURE — 99386 PREV VISIT NEW AGE 40-64: CPT | Performed by: NURSE PRACTITIONER

## 2021-06-25 PROCEDURE — 81002 URINALYSIS NONAUTO W/O SCOPE: CPT | Performed by: NURSE PRACTITIONER

## 2021-06-25 NOTE — PROGRESS NOTES
Subjective   Chief Complaint   Patient presents with   • Gynecologic Exam     WWE/ feels like bladder is falling     Reyna Mayfield is a 60 y.o. year old  presenting to be seen for her annual exam.  Pt has concerns about her bladder dropping. Notices vaginal pressure and urgency. Sometimes noticing a bulge at the vagina. Some days are worse than others. She works at [employer] and does significant lifting at work, which seems to worsen her symptoms. No dysuria, hematuria, no constipation issues.     She is not sexually active.  In the past 12 months there has not been new sexual partners.  Condoms are not typically used.  She would not like to be screened for STD's at today's exam.     She exercises regularly: yes.  She wears her seat belt:yes.  She has concerns about domestic violence: no.  She is taking Vit D and Calcium:yes  Last colonoscopy: 11/10/2017  Last DEXA: Never  Last MM17  Last PAP: 17  Hx of abnormal pap: No      NATURAL MENOPAUSE  LMP: LMP 13-15 years ago    OB History        3    Para   3    Term   3            AB        Living           SAB        TAB        Ectopic        Molar        Multiple        Live Births                  No Additional Complaints Reported    The following portions of the patient's history were reviewed and updated as appropriate:problem list, current medications, allergies, past family history, past medical history, past social history and past surgical history.    Social History    Tobacco Use      Smoking status: Current Every Day Smoker        Packs/day: 1.00        Years: 35.00        Pack years: 35      Smokeless tobacco: Never Used        Review of Systems   Constitutional: Negative.  Negative for chills and fever.   Respiratory: Negative.    Cardiovascular: Negative.    Gastrointestinal: Negative.    Endocrine: Negative.    Genitourinary: Positive for frequency and urgency. Negative for difficulty urinating, dysuria, flank pain,  "hematuria, pelvic pain, vaginal bleeding, vaginal discharge and vaginal pain.        Vaginal pressure and bulging   Skin: Negative.    Neurological: Negative for dizziness, syncope, light-headedness and headaches.   Psychiatric/Behavioral: Negative for suicidal ideas. The patient is not nervous/anxious.         Stress with working 6 days a week         Objective   /60   Pulse 77   Ht 160 cm (63\")   Wt 56.5 kg (124 lb 9.6 oz)   LMP  (LMP Unknown)   Breastfeeding No   BMI 22.07 kg/m²     General:  well developed; well nourished  no acute distress   Skin:  No suspicious lesions seen   Cardiac: Heart sounds are normal.  Regular rate and rhythm without murmur, gallop or rub.   Resp:  Normal expansion.  Clear to auscultation.  No rales, rhonchi, or wheezing.   Thyroid: not examined   Breasts:  Examined in supine position  Symmetric without masses or skin dimpling  Nipples normal without inversion, lesions or discharge  There are no palpable axillary nodes  Fibrocystic changes are present both breasts without a discrete mass   Abdomen: soft, non-tender; no masses  no umbilical or inginual hernias are present  no hepato-splenomegaly  Normal findings: bowel sounds normal   Psych: alert,oriented, in NAD with a full range of affect, normal behavior and no psychotic features   Pelvis: Clinical staff was present for exam  External genitalia:  normal appearance of the external genitalia including Bartholin's and Carrizo Hill's glands. small skin tag note on left buttock adjacent to anus  :  urethral meatus normal; urethral hypermobility is absent.  Vaginal:  normal pink mucosa without lesions. Grade 1-2 with valsalva cystocele and uterine prolapse  Cervix:  normal appearance.  Uterus:  normal size, shape and consistency.  Adnexa:  non palpable bilaterally.  Rectal:  anus visually normal appearing     Lab Review   UA   Brief Urine Lab Results  (Last result in the past 365 days)      Color   Clarity   Blood   Leuk Est   " Nitrite   Protein   CREAT   Urine HCG        06/25/21 1414 Straw Clear Trace Negative Negative Negative             Imaging  Mammogram report        Diagnoses and all orders for this visit:    1. Encounter for gynecological examination with Papanicolaou smear of cervix (Primary)  -     Liquid-based Pap Smear, Screening    2. Screening mammogram, encounter for  -     Mammo Screening Digital Tomosynthesis Bilateral With CAD; Future    3. Cystocele with prolapse    4. Urinary urgency  -     POC Urinalysis Dipstick    Pap results: I will send card in mail or call if abnormal. RTC annually for well woman exams.     SBE encouraged monthly. MMG is over due. Pt scheduled for 7/30. Colonoscopy is up to date.     Pt declines COVID vaccine today.     She is up to date and already scheduled in Sept with PCP. Pt is a healthy weight and is no longer having unexplained weight loss.    UA negative for infection. Trace blood noted. Pt does have a small cystocele and uterine descent. I discussed options including monitoring, pelvic floor PT, pessary, vs surgical intervention. I would strongly encourage PT but pt declines because of work. While I do not feel surgery is warranted at this time, she also doesn't want to be down from surgery due to work. Pt requests pessary. We will schedule a FU fitting for pessary. Pt agrees with this plan.      This note was electronically signed.    Nita Solomon, APRN  June 28, 2021

## 2021-06-30 LAB
LAB AP CASE REPORT: NORMAL
PATH INTERP SPEC-IMP: NORMAL

## 2021-07-09 ENCOUNTER — OFFICE VISIT (OUTPATIENT)
Dept: OBSTETRICS AND GYNECOLOGY | Facility: CLINIC | Age: 61
End: 2021-07-09

## 2021-07-09 VITALS
BODY MASS INDEX: 21.62 KG/M2 | HEIGHT: 63 IN | WEIGHT: 122 LBS | SYSTOLIC BLOOD PRESSURE: 100 MMHG | HEART RATE: 70 BPM | DIASTOLIC BLOOD PRESSURE: 62 MMHG

## 2021-07-09 DIAGNOSIS — N81.4 CYSTOCELE WITH PROLAPSE: ICD-10-CM

## 2021-07-09 DIAGNOSIS — Z46.89 FITTING AND ADJUSTMENT OF PESSARY: Primary | ICD-10-CM

## 2021-07-09 PROCEDURE — 57160 INSERT PESSARY/OTHER DEVICE: CPT | Performed by: NURSE PRACTITIONER

## 2021-07-09 NOTE — PROGRESS NOTES
"Pessary insertion    Date of procedure:  7/9/2021    Risks and benefits discussed? yes  All questions answered? yes  Consents given by the patient    Pessary placed: Foldable ring w/ support - #4 and #5 w/o urethral bar       Pessary's attempted without good fit: Foldable ring w/ support - #5 w/o urethral bar.       #4 ring with support w/o knob was the best fit. She was able to ambulate. Can \"feel it\" but denies pain. Pt tolerated well.      Follow up needed: 1 week(s) for insertion of #4 pending arrival of device.     This note was electronically signed.          This document has been electronically signed by JACINDA Gordon on July 9, 2021 15:11 CDT      July 9, 2021    "

## 2021-07-16 ENCOUNTER — OFFICE VISIT (OUTPATIENT)
Dept: OBSTETRICS AND GYNECOLOGY | Facility: CLINIC | Age: 61
End: 2021-07-16

## 2021-07-16 VITALS
HEART RATE: 72 BPM | HEIGHT: 63 IN | DIASTOLIC BLOOD PRESSURE: 78 MMHG | SYSTOLIC BLOOD PRESSURE: 110 MMHG | BODY MASS INDEX: 21.48 KG/M2 | WEIGHT: 121.2 LBS

## 2021-07-16 DIAGNOSIS — Z46.89 PESSARY MAINTENANCE: Primary | ICD-10-CM

## 2021-07-16 DIAGNOSIS — N81.4 CYSTOCELE WITH PROLAPSE: ICD-10-CM

## 2021-07-16 PROCEDURE — A4561 PESSARY RUBBER, ANY TYPE: HCPCS | Performed by: NURSE PRACTITIONER

## 2021-07-16 PROCEDURE — 99212 OFFICE O/P EST SF 10 MIN: CPT | Performed by: NURSE PRACTITIONER

## 2021-07-16 NOTE — PROGRESS NOTES
Subjective   Reyna Mayfield is a 61 y.o. female.     History of Present Illness   Pt presents to  her pessary. We fitted her for a #4 w/support on 7/9/2021. Device had to be ordered. Pt doesn't feel comfortable inserting and removing on her own. We will place it today for her.     Today upon arrival, pt states her employer has decided to get her more help, which will allow her to take some time off for surgical consultation. I will go ahead and place referral for evaluation and management by surgery. Pt does want the pessary in the mean time.     The following portions of the patient's history were reviewed and updated as appropriate: allergies, current medications, past family history, past medical history, past social history, past surgical history and problem list.    Review of Systems   Constitutional: Negative.  Negative for chills and fever.   Genitourinary: Positive for frequency, pelvic pressure and urgency. Negative for dysuria, pelvic pain and vaginal discharge.       Objective   Physical Exam  Constitutional:       Appearance: Normal appearance.   Genitourinary:     Comments: #4 ring w/support pessary inserted. Pt able to ambulate and urinate. No pain or expulsion. Pt tolerated well.           Assessment/Plan   Diagnoses and all orders for this visit:    1. Pessary maintenance (Primary)    2. Cystocele with prolapse  -     Ambulatory Referral to Obstetrics / Gynecology      Pt tolerate pessary insertion well. RTC in 6 weeks for cleaning or sooner PRN with expulsion or irritation.     Referral placed to GYN at [facility] for consideration of surgical intervention for cystocele.

## 2021-09-01 ENCOUNTER — OFFICE VISIT (OUTPATIENT)
Dept: FAMILY MEDICINE CLINIC | Facility: CLINIC | Age: 61
End: 2021-09-01

## 2021-09-01 VITALS
TEMPERATURE: 97.4 F | HEIGHT: 63 IN | DIASTOLIC BLOOD PRESSURE: 60 MMHG | BODY MASS INDEX: 21.79 KG/M2 | HEART RATE: 80 BPM | WEIGHT: 123 LBS | SYSTOLIC BLOOD PRESSURE: 100 MMHG

## 2021-09-01 DIAGNOSIS — Z01.818 PREOP EXAM FOR INTERNAL MEDICINE: Primary | ICD-10-CM

## 2021-09-01 DIAGNOSIS — Z72.0 TOBACCO ABUSE: Chronic | ICD-10-CM

## 2021-09-01 DIAGNOSIS — F33.1 MAJOR DEPRESSIVE DISORDER, RECURRENT, MODERATE (HCC): Chronic | ICD-10-CM

## 2021-09-01 DIAGNOSIS — N81.4 UTERINE PROLAPSE: ICD-10-CM

## 2021-09-01 DIAGNOSIS — F17.218 NICOTINE DEPENDENCE, CIGARETTES, WITH OTHER NICOTINE-INDUCED DISORDERS: ICD-10-CM

## 2021-09-01 DIAGNOSIS — F41.1 GAD (GENERALIZED ANXIETY DISORDER): Chronic | ICD-10-CM

## 2021-09-01 DIAGNOSIS — K21.00 GASTROESOPHAGEAL REFLUX DISEASE WITH ESOPHAGITIS, UNSPECIFIED WHETHER HEMORRHAGE: Chronic | ICD-10-CM

## 2021-09-01 DIAGNOSIS — E78.2 MIXED HYPERLIPIDEMIA: Chronic | ICD-10-CM

## 2021-09-01 DIAGNOSIS — N81.4 CYSTOCELE WITH PROLAPSE: ICD-10-CM

## 2021-09-01 PROCEDURE — 93005 ELECTROCARDIOGRAM TRACING: CPT | Performed by: INTERNAL MEDICINE

## 2021-09-01 PROCEDURE — 99214 OFFICE O/P EST MOD 30 MIN: CPT | Performed by: INTERNAL MEDICINE

## 2021-09-01 NOTE — PROGRESS NOTES
"Chief Complaint  pre op EKG and check up    Subjective          History of Present Illness     Reyna Mayfield presents to the office today for pre-operative exam and EKG required prior to total laporoscopic hysterectomy, bilateral salpingo-oophorectomy, and TVT 09/23/2021 by Finn August at [facility] to address symptomatic cystocele and vaginal/bladder prolapse.  EKG performed in the office reveals normal sinus rhythm and is unremarkable.  She is a tobacco smoker.  Denies chest pain, CRESPO, PND, orthopnea or claudication.  Denies syncope/ presyncope.  Denies chest pain or palpitations.  Patient is average risk to undergo above procedure, although she understands that the cigarette smoking does increase risk and may hamper postoperative healing.       Patient has received Chantix and plans to start the medication while she is off work post surgical procedure later this month.  I suggested that she start Chantix prior to her surgery and try to be off of cigarettes entirely by the time she undergoes surgery in 3 weeks.  She feels MALISSA and depression is adequate managed with Lexapro.    GERD symptoms are well controlled with Protonix.    She is due annual follow up on chronic medical issues and this appointment is scheduled today.     She is taking a baby aspirin daily.  This will need to be held 5 days prior to surgery.      Objective   Vital Signs:   /60   Pulse 80   Temp 97.4 °F (36.3 °C) (Tympanic)   Ht 160 cm (63\")   Wt 55.8 kg (123 lb)   BMI 21.79 kg/m²       Physical Exam  Vitals reviewed.   Constitutional:       General: She is not in acute distress.     Appearance: She is well-developed.   HENT:      Head: Normocephalic and atraumatic.      Nose:      Right Sinus: No maxillary sinus tenderness or frontal sinus tenderness.      Left Sinus: No maxillary sinus tenderness or frontal sinus tenderness.      Mouth/Throat:      Mouth: No oral lesions.      Pharynx: " Uvula midline.      Tonsils: No tonsillar exudate.   Eyes:      Conjunctiva/sclera: Conjunctivae normal.      Pupils: Pupils are equal, round, and reactive to light.   Neck:      Thyroid: No thyroid mass or thyromegaly.      Vascular: No carotid bruit or JVD.      Trachea: Trachea normal. No tracheal deviation.   Cardiovascular:      Rate and Rhythm: Normal rate and regular rhythm.  No extrasystoles are present.     Chest Wall: PMI is not displaced.      Heart sounds: Normal heart sounds. No murmur heard.     Pulmonary:      Effort: Pulmonary effort is normal. No accessory muscle usage or respiratory distress.      Breath sounds: Normal breath sounds. No decreased breath sounds, wheezing, rhonchi or rales.      Comments: Chronic lung sounds.  No wheezes.   Abdominal:      General: Bowel sounds are normal. There is no distension.      Palpations: Abdomen is soft.      Tenderness: There is no abdominal tenderness.   Musculoskeletal:      Cervical back: Neck supple.   Lymphadenopathy:      Cervical: No cervical adenopathy.   Skin:     General: Skin is warm and dry.      Findings: No rash.      Nails: There is no clubbing.   Neurological:      Mental Status: She is alert and oriented to person, place, and time.      Cranial Nerves: No cranial nerve deficit.      Coordination: Coordination normal.   Psychiatric:         Speech: Speech normal.         Behavior: Behavior normal.         Thought Content: Thought content normal.         Judgment: Judgment normal.            Result Review :         Data reviewed: Consultant notes Nita Lara notes         ECG 12 Lead    Date/Time: 9/1/2021 2:45 PM  Performed by: Nitin Cochran MD  Authorized by: Nitin Cochran MD   Previous ECG: no previous ECG available  Rhythm: sinus rhythm  Rate: normal  Conduction: conduction normal  QRS axis: normal    Clinical impression: normal ECG                 Assessment and Plan    Diagnoses and all orders for this visit:    1. Preop  exam for internal medicine (Primary)  -     CBC Auto Differential; Future  -     Comprehensive Metabolic Panel; Future  -     Lipid Panel; Future  -     TSH; Future    2. tobacco abuse - 1ppd    3. MALISSA (generalized anxiety disorder)    4. Major depressive disorder, recurrent, moderate (CMS/HCC)    5. Gastroesophageal reflux disease with esophagitis, unspecified whether hemorrhage    6. Uterine prolapse    7. Cystocele with prolapse    8. Mixed hyperlipidemia  -     CBC Auto Differential; Future  -     Comprehensive Metabolic Panel; Future  -     Lipid Panel; Future  -     TSH; Future    9. Nicotine dependence, cigarettes, with other nicotine-induced disorders         I spent 31 minutes caring for Reyna on this date of service. This time includes time spent by me in the following activities:preparing for the visit, reviewing tests, obtaining and/or reviewing a separately obtained history, performing a medically appropriate examination and/or evaluation , counseling and educating the patient/family/caregiver, ordering medications, tests, or procedures, referring and communicating with other health care professionals , documenting information in the medical record and independently interpreting results and communicating that information with the patient/family/caregiver     After informed verbal consent, EKG is performed in the office revealing normal sinus rhythm with ventricular rate 70.   Patient is average risk to undergo the total laporoscopic hysterectomy, bilateral salpingo-oophorectomy, and TVT 09/23/2021 by Finn August [facility] EKG is faxed to GYN office.      Continue the Lexapro for MALISSA/depression, which is well controlled.  I encouraged patient to start the Chantix soon with goal of weaning off cigarettes prior to her upcoming surgery.    Continue the Protonix for GERD.    Annual follow up appointment is scheduled today, to be in approximately 6 weeks.   Patient will need to have fasting labs one week prior.        Scribed for Dr. Cochran by Mihaela Calderón [facility].         Follow Up   Return in about 6 weeks (around 10/13/2021) for Next scheduled follow up - labs 1 week prior.  Patient was given instructions and counseling regarding her condition or for health maintenance advice. Please see specific information pulled into the AVS if appropriate.

## 2021-12-13 RX ORDER — ESCITALOPRAM OXALATE 10 MG/1
TABLET ORAL
Qty: 90 TABLET | Refills: 3 | OUTPATIENT
Start: 2021-12-13

## 2022-02-09 ENCOUNTER — LAB (OUTPATIENT)
Dept: LAB | Facility: OTHER | Age: 62
End: 2022-02-09

## 2022-02-09 DIAGNOSIS — E78.2 MIXED HYPERLIPIDEMIA: Chronic | ICD-10-CM

## 2022-02-09 DIAGNOSIS — Z01.818 PREOP EXAM FOR INTERNAL MEDICINE: ICD-10-CM

## 2022-02-09 LAB
ALBUMIN SERPL-MCNC: 4.2 G/DL (ref 3.5–5)
ALBUMIN/GLOB SERPL: 1.4 G/DL (ref 1.1–1.8)
ALP SERPL-CCNC: 79 U/L (ref 38–126)
ALT SERPL W P-5'-P-CCNC: 19 U/L
ANION GAP SERPL CALCULATED.3IONS-SCNC: 4 MMOL/L (ref 5–15)
AST SERPL-CCNC: 30 U/L (ref 14–36)
BASOPHILS # BLD AUTO: 0.06 10*3/MM3 (ref 0–0.2)
BASOPHILS NFR BLD AUTO: 1.1 % (ref 0–1.5)
BILIRUB SERPL-MCNC: 0.4 MG/DL (ref 0.2–1.3)
BUN SERPL-MCNC: 6 MG/DL (ref 7–23)
BUN/CREAT SERPL: 11.3 (ref 7–25)
CALCIUM SPEC-SCNC: 8.9 MG/DL (ref 8.4–10.2)
CHLORIDE SERPL-SCNC: 104 MMOL/L (ref 101–112)
CHOLEST SERPL-MCNC: 249 MG/DL (ref 150–200)
CO2 SERPL-SCNC: 31 MMOL/L (ref 22–30)
CREAT SERPL-MCNC: 0.53 MG/DL (ref 0.52–1.04)
DEPRECATED RDW RBC AUTO: 45.1 FL (ref 37–54)
EOSINOPHIL # BLD AUTO: 0.11 10*3/MM3 (ref 0–0.4)
EOSINOPHIL NFR BLD AUTO: 2 % (ref 0.3–6.2)
ERYTHROCYTE [DISTWIDTH] IN BLOOD BY AUTOMATED COUNT: 14.2 % (ref 12.3–15.4)
GFR SERPL CREATININE-BSD FRML MDRD: 118 ML/MIN/1.73 (ref 51–120)
GLOBULIN UR ELPH-MCNC: 2.9 GM/DL (ref 2.3–3.5)
GLUCOSE SERPL-MCNC: 99 MG/DL (ref 70–99)
HCT VFR BLD AUTO: 42.3 % (ref 34–46.6)
HDLC SERPL-MCNC: 57 MG/DL (ref 40–59)
HGB BLD-MCNC: 13.7 G/DL (ref 12–15.9)
LDLC SERPL CALC-MCNC: 172 MG/DL
LDLC/HDLC SERPL: 2.98 {RATIO} (ref 0–3.22)
LYMPHOCYTES # BLD AUTO: 1.95 10*3/MM3 (ref 0.7–3.1)
LYMPHOCYTES NFR BLD AUTO: 35.8 % (ref 19.6–45.3)
MCH RBC QN AUTO: 28.7 PG (ref 26.6–33)
MCHC RBC AUTO-ENTMCNC: 32.4 G/DL (ref 31.5–35.7)
MCV RBC AUTO: 88.5 FL (ref 79–97)
MONOCYTES # BLD AUTO: 0.35 10*3/MM3 (ref 0.1–0.9)
MONOCYTES NFR BLD AUTO: 6.4 % (ref 5–12)
NEUTROPHILS NFR BLD AUTO: 2.97 10*3/MM3 (ref 1.7–7)
NEUTROPHILS NFR BLD AUTO: 54.7 % (ref 42.7–76)
PLATELET # BLD AUTO: 186 10*3/MM3 (ref 140–450)
PMV BLD AUTO: 9.9 FL (ref 6–12)
POTASSIUM SERPL-SCNC: 4.1 MMOL/L (ref 3.4–5)
PROT SERPL-MCNC: 7.1 G/DL (ref 6.3–8.6)
RBC # BLD AUTO: 4.78 10*6/MM3 (ref 3.77–5.28)
SODIUM SERPL-SCNC: 139 MMOL/L (ref 137–145)
TRIGL SERPL-MCNC: 112 MG/DL
TSH SERPL DL<=0.05 MIU/L-ACNC: 2.52 UIU/ML (ref 0.27–4.2)
VLDLC SERPL-MCNC: 20 MG/DL (ref 5–40)
WBC NRBC COR # BLD: 5.44 10*3/MM3 (ref 3.4–10.8)

## 2022-02-09 PROCEDURE — 85025 COMPLETE CBC W/AUTO DIFF WBC: CPT | Performed by: INTERNAL MEDICINE

## 2022-02-09 PROCEDURE — 80061 LIPID PANEL: CPT | Performed by: INTERNAL MEDICINE

## 2022-02-09 PROCEDURE — 80053 COMPREHEN METABOLIC PANEL: CPT | Performed by: INTERNAL MEDICINE

## 2022-02-09 PROCEDURE — 36415 COLL VENOUS BLD VENIPUNCTURE: CPT | Performed by: INTERNAL MEDICINE

## 2022-02-09 PROCEDURE — 84443 ASSAY THYROID STIM HORMONE: CPT | Performed by: INTERNAL MEDICINE

## 2022-02-11 ENCOUNTER — OFFICE VISIT (OUTPATIENT)
Dept: FAMILY MEDICINE CLINIC | Facility: CLINIC | Age: 62
End: 2022-02-11

## 2022-02-11 VITALS
WEIGHT: 129.4 LBS | TEMPERATURE: 97.5 F | BODY MASS INDEX: 22.93 KG/M2 | HEART RATE: 80 BPM | SYSTOLIC BLOOD PRESSURE: 100 MMHG | HEIGHT: 63 IN | DIASTOLIC BLOOD PRESSURE: 60 MMHG

## 2022-02-11 DIAGNOSIS — Z12.2 SCREENING FOR LUNG CANCER: ICD-10-CM

## 2022-02-11 DIAGNOSIS — K21.00 GASTROESOPHAGEAL REFLUX DISEASE WITH ESOPHAGITIS, UNSPECIFIED WHETHER HEMORRHAGE: Chronic | ICD-10-CM

## 2022-02-11 DIAGNOSIS — G44.209 TENSION HEADACHE: Chronic | ICD-10-CM

## 2022-02-11 DIAGNOSIS — F41.1 GAD (GENERALIZED ANXIETY DISORDER): Chronic | ICD-10-CM

## 2022-02-11 DIAGNOSIS — F17.218 NICOTINE DEPENDENCE, CIGARETTES, WITH OTHER NICOTINE-INDUCED DISORDERS: ICD-10-CM

## 2022-02-11 DIAGNOSIS — Z72.0 TOBACCO ABUSE: Chronic | ICD-10-CM

## 2022-02-11 DIAGNOSIS — H61.22 IMPACTED CERUMEN OF LEFT EAR: ICD-10-CM

## 2022-02-11 DIAGNOSIS — K59.1 FUNCTIONAL DIARRHEA: Chronic | ICD-10-CM

## 2022-02-11 DIAGNOSIS — N32.81 OAB (OVERACTIVE BLADDER): Chronic | ICD-10-CM

## 2022-02-11 DIAGNOSIS — E78.2 MIXED HYPERLIPIDEMIA: Primary | Chronic | ICD-10-CM

## 2022-02-11 DIAGNOSIS — F33.1 MAJOR DEPRESSIVE DISORDER, RECURRENT, MODERATE: Chronic | ICD-10-CM

## 2022-02-11 PROBLEM — H69.83 DYSFUNCTION OF BOTH EUSTACHIAN TUBES: Chronic | Status: ACTIVE | Noted: 2022-02-11

## 2022-02-11 PROBLEM — N81.11 MIDLINE CYSTOCELE: Status: ACTIVE | Noted: 2021-09-23

## 2022-02-11 PROCEDURE — 99214 OFFICE O/P EST MOD 30 MIN: CPT | Performed by: INTERNAL MEDICINE

## 2022-02-11 RX ORDER — FLUTICASONE PROPIONATE 50 MCG
1 SPRAY, SUSPENSION (ML) NASAL 2 TIMES DAILY
Qty: 15.8 ML | Refills: 6 | Status: SHIPPED | OUTPATIENT
Start: 2022-02-11 | End: 2022-05-02 | Stop reason: SDUPTHER

## 2022-02-11 RX ORDER — ESCITALOPRAM OXALATE 10 MG/1
10 TABLET ORAL DAILY
Qty: 90 TABLET | Refills: 3 | Status: SHIPPED | OUTPATIENT
Start: 2022-02-11 | End: 2023-01-16

## 2022-02-11 RX ORDER — PANTOPRAZOLE SODIUM 40 MG/1
40 TABLET, DELAYED RELEASE ORAL DAILY
Qty: 90 TABLET | Refills: 3 | Status: SHIPPED | OUTPATIENT
Start: 2022-02-11 | End: 2022-12-15

## 2022-02-11 RX ORDER — VARENICLINE TARTRATE 1 MG/1
TABLET, FILM COATED ORAL
Qty: 180 TABLET | Refills: 0 | Status: SHIPPED | OUTPATIENT
Start: 2022-02-11 | End: 2022-03-30

## 2022-02-11 NOTE — PROGRESS NOTES
Chief Complaint  Annual Exam (needs CT screen lung and the order is placed ), Urinary Incontinence (since having hysterectomy last year. She has tried Oxybutin but states didnt help and she quit), and Ear Fullness (she states they feel full )    Subjective          History of Present Illness     Reyna Mayfield presents to for overdue annual follow up on multiple chronic conditions including GERD, MALISSA, major depressive disorder, mixed hyperlipidemia, tobacco abuse, and history of colon polyps.  Patient underwent total laporoscopic hysterectomy and salpingo oophorectomy by  at [facility] 09/21/2021.  She reports urinary urgency and leakage since hysterectomy with symptoms both day and night.  Denies urinary retention or classic UTI symptoms.  She tried Oxybutynin without improvement in symptoms.  She has not tried Myrbetriq, but would like to try. GERD symptoms adequately controlled with Protonix.  Irritable bowel symptoms improved.     She feels Lexapro is adequately managing depression, but feels anxiety continues to flare.  She has been on the Lexapro for quite some time.  With starting Chantix today, we don't want to make many changes at the same time.  She is gong to monitor for another month and notify us if she feels anxiety symptoms continue to persist.             Patient continues to smoke a pack of cigarettes daily.  CT lung screen 10/2020 reveals no evidence of lung cancer, but did reveal emphysema changes and even some scarring at the tops of the lungs.  She is in agreement to repeat lung cancer screening and is also willing to try Chantix to help with cessation efforts.     Weight is up 6 pounds in the past six months, although, BMI is 22.9.      Patient is describing symptoms of eustachian tube dysfunction, especially left ear.  She has been using a water pick and ear candle.   She declines ear irrigation today, but will notify us if the water pick and ear  "candle to not remove the cerumen.  I sent a prescription for Flonase nasal spray for the ETD.    The patient's relevant past medical, surgical, and social history was reviewed in Epic.   Lab results are reviewed with the patient today.  CBC unremarkable.  Fasting glucose 99.  Normal renal and liver function.  Total cholesterol is 249 and LDL cholesterol is far above goal at 172 without statin.  She admits to eating a lot of high fat foods.  She felt like using an air fryer was more healthy.         Objective   Vital Signs:   /60   Pulse 80   Temp 97.5 °F (36.4 °C) (Tympanic)   Ht 160 cm (63\")   Wt 58.7 kg (129 lb 6.4 oz)   BMI 22.92 kg/m²       Physical Exam  Vitals reviewed.   Constitutional:       General: She is not in acute distress.     Appearance: She is well-developed.      Comments: Pleasant female.    HENT:      Head: Normocephalic and atraumatic.      Ears:      Comments: Ear exam reveals effusion on the left and cerumen impaction right ear canal.     Nose:      Right Sinus: No maxillary sinus tenderness or frontal sinus tenderness.      Left Sinus: No maxillary sinus tenderness or frontal sinus tenderness.      Mouth/Throat:      Mouth: No oral lesions.      Pharynx: Uvula midline.      Tonsils: No tonsillar exudate.      Comments: Benign's smoker's changes posterior oropharynx.   Eyes:      Conjunctiva/sclera: Conjunctivae normal.      Pupils: Pupils are equal, round, and reactive to light.   Neck:      Thyroid: No thyroid mass or thyromegaly.      Vascular: No carotid bruit or JVD.      Trachea: Trachea normal. No tracheal deviation.   Cardiovascular:      Rate and Rhythm: Normal rate and regular rhythm.  No extrasystoles are present.     Chest Wall: PMI is not displaced.      Heart sounds: Normal heart sounds. No murmur heard.      Pulmonary:      Effort: Pulmonary effort is normal. No accessory muscle usage or respiratory distress.      Breath sounds: Normal breath sounds. No decreased " breath sounds, wheezing, rhonchi or rales.      Comments: Chronic lung sounds.   Abdominal:      General: Bowel sounds are normal. There is no distension.      Palpations: Abdomen is soft.      Tenderness: There is no abdominal tenderness.   Musculoskeletal:      Cervical back: Neck supple.   Lymphadenopathy:      Cervical: No cervical adenopathy.   Skin:     General: Skin is warm and dry.      Findings: No rash.      Nails: There is no clubbing.   Neurological:      Mental Status: She is alert and oriented to person, place, and time.      Cranial Nerves: No cranial nerve deficit.      Coordination: Coordination normal.   Psychiatric:         Speech: Speech normal.         Behavior: Behavior normal.         Thought Content: Thought content normal.         Judgment: Judgment normal.            Result Review :     CMP    CMP 2/9/22   Glucose 99   BUN 6 (A)   Creatinine 0.53   eGFR Non African Am 118   Sodium 139   Potassium 4.1   Chloride 104   Calcium 8.9   Albumin 4.20   Total Bilirubin 0.4   Alkaline Phosphatase 79   AST (SGOT) 30   ALT (SGPT) 19   (A) Abnormal value            CBC w/diff    CBC w/Diff 2/9/22   WBC 5.44   RBC 4.78   Hemoglobin 13.7   Hematocrit 42.3   MCV 88.5   MCH 28.7   MCHC 32.4   RDW 14.2   Platelets 186   Neutrophil Rel % 54.7   Lymphocyte Rel % 35.8   Monocyte Rel % 6.4   Eosinophil Rel % 2.0   Basophil Rel % 1.1           Lipid Panel    Lipid Panel 2/9/22   Total Cholesterol 249 (A)   Triglycerides 112   HDL Cholesterol 57   VLDL Cholesterol 20   LDL Cholesterol  172 (A)   LDL/HDL Ratio 2.98   (A) Abnormal value            TSH    TSH 2/9/22   TSH 2.520               Data reviewed: Consultant notes   at [facility]           Assessment and Plan    Diagnoses and all orders for this visit:    1. Mixed hyperlipidemia (Primary)  -     Lipid Panel; Future    2. Screening for lung cancer  -     CT Chest Low Dose Wo; Future    3. OAB (overactive bladder)    4.  Functional diarrhea    5. Gastroesophageal reflux disease with esophagitis, unspecified whether hemorrhage    6. MALISSA (generalized anxiety disorder)    7. Major depressive disorder, recurrent, moderate (HCC)    8. Nicotine dependence, cigarettes, with other nicotine-induced disorders    9. tobacco abuse - 1ppd    10. Tension headache    11. Impacted cerumen of left ear    Other orders  -     escitalopram (LEXAPRO) 10 MG tablet; Take 1 tablet by mouth Daily.  Dispense: 90 tablet; Refill: 3  -     pantoprazole (PROTONIX) 40 MG EC tablet; Take 1 tablet by mouth Daily.  Dispense: 90 tablet; Refill: 3  -     Mirabegron ER (MYRBETRIQ) 50 MG tablet sustained-release 24 hour 24 hr tablet; Take 50 mg by mouth Daily.  Dispense: 90 tablet; Refill: 3  -     varenicline (Chantix) 1 MG tablet; 1/2-1 tablet bid to stop smoking  Dispense: 180 tablet; Refill: 0  -     fluticasone (FLONASE) 50 MCG/ACT nasal spray; 1 spray into the nostril(s) as directed by provider 2 (Two) Times a Day. Administer 1 spray in each nostril twice daily.  Dispense: 15.8 mL; Refill: 6      I spent 31 minutes caring for Reyna on this date of service. This time includes time spent by me in the following activities:preparing for the visit, reviewing tests, obtaining and/or reviewing a separately obtained history, performing a medically appropriate examination and/or evaluation , counseling and educating the patient/family/caregiver, ordering medications, tests, or procedures and documenting information in the medical record     For the overactive bladder symptoms, I sent a mail order prescription for Myrbetriq.     Continue the Lexapro for now.  If she doesn't feel anxiety symptoms improve in a month, she can return and we will discuss switching her SSRI since she has been on Lexapro for quite a while.  I suspect Pristiq would be a good option to help with depression and anxiety.    An order is placed for patient to schedule screening CT of the lungs due to  tobacco abuse.  I advised the patient of the risks in continuing to use tobacco products.  Patient is urged to stop smoking and never start again.  She is willing to try Chantix if mail order pharmacy covers the Chantix. I sent a prescription for the Chantix to take 1/2 tablet q.d. for one week progressing to 1/2 tablet b.i.d. the second week   I recommended she stay on the medication for 3-6 months for improved efficacy.    We will recheck lipids in six months, at which time if no improvement, we will need to start statin.         Recommended she reconsider COVID vaccines.   I informed her that it is my opinion that she is high risk for infection and moderate to high risk for complications of infection.    She declines ear irrigation today due to another appointment. Recommended she continue to use the water pick to try to remove cerumen.  Notify us if unsuccessful and we can schedule ear irrigation.  I sent prescription for Flonase nasal spray to use one spray each nostril b.i.d. for eustachian tube dysfunction.   Smoking cessation recommended.        Continue Protonix, which works well to manage GERD.     Return in six months with fasting labs one week prior.  If no improvement in cholesterol, we will need to start statin as above.      Scribed for Dr. Cochran by Mihaela Calderón [facility].     Follow Up   Return in about 6 months (around 8/11/2022) for  Next scheduled follow up - labs 1 week prior.  Patient was given instructions and counseling regarding her condition or for health maintenance advice. Please see specific information pulled into the AVS if appropriate.

## 2022-02-11 NOTE — PATIENT INSTRUCTIONS
Steps to Quit Smoking  Smoking tobacco is the leading cause of preventable death. It can affect almost every organ in the body. Smoking puts you and those around you at risk for developing many serious chronic diseases. Quitting smoking can be difficult, but it is one of the best things that you can do for your health. It is never too late to quit.  How do I get ready to quit?  When you decide to quit smoking, create a plan to help you succeed. Before you quit:  · Pick a date to quit. Set a date within the next 2 weeks to give you time to prepare.  · Write down the reasons why you are quitting. Keep this list in places where you will see it often.  · Tell your family, friends, and co-workers that you are quitting. Support from your loved ones can make quitting easier.  · Talk with your health care provider about your options for quitting smoking.  · Find out what treatment options are covered by your health insurance.  · Identify people, places, things, and activities that make you want to smoke (triggers). Avoid them.  What first steps can I take to quit smoking?  · Throw away all cigarettes at home, at work, and in your car.  · Throw away smoking accessories, such as ashtrays and lighters.  · Clean your car. Make sure to empty the ashtray.  · Clean your home, including curtains and carpets.  What strategies can I use to quit smoking?  Talk with your health care provider about combining strategies, such as taking medicines while you are also receiving in-person counseling. Using these two strategies together makes you more likely to succeed in quitting than if you used either strategy on its own.  · If you are pregnant or breastfeeding, talk with your health care provider about finding counseling or other support strategies to quit smoking. Do not take medicine to help you quit smoking unless your health care provider tells you to do so.  To quit smoking:  Quit right away  · Quit smoking completely, instead of  gradually reducing how much you smoke over a period of time. Research shows that stopping smoking right away is more successful than gradually quitting.  · Attend in-person counseling to help you build problem-solving skills. You are more likely to succeed in quitting if you attend counseling sessions regularly. Even short sessions of 10 minutes can be effective.  Take medicine  You may take medicines to help you quit smoking. Some medicines require a prescription and some you can purchase over-the-counter. Medicines may have nicotine in them to replace the nicotine in cigarettes. Medicines may:  · Help to stop cravings.  · Help to relieve withdrawal symptoms.  Your health care provider may recommend:  · Nicotine patches, gum, or lozenges.  · Nicotine inhalers or sprays.  · Non-nicotine medicine that is taken by mouth.  Find resources  Find resources and support systems that can help you to quit smoking and remain smoke-free after you quit. These resources are most helpful when you use them often. They include:  · Online chats with a counselor.  · Telephone quitlines.  · Printed self-help materials.  · Support groups or group counseling.  · Text messaging programs.  · Mobile phone apps or applications. Use apps that can help you stick to your quit plan by providing reminders, tips, and encouragement. There are many free apps for mobile devices as well as websites. Examples include Quit Guide from the CDC and smokefree.gov  What things can I do to make it easier to quit?    · Reach out to your family and friends for support and encouragement. Call telephone quitlines (5-182-QUIT-NOW), reach out to support groups, or work with a counselor for support.  · Ask people who smoke to avoid smoking around you.  · Avoid places that trigger you to smoke, such as bars, parties, or smoke-break areas at work.  · Spend time with people who do not smoke.  · Lessen the stress in your life. Stress can be a smoking trigger for some  people. To lessen stress, try:  ? Exercising regularly.  ? Doing deep-breathing exercises.  ? Doing yoga.  ? Meditating.  ? Performing a body scan. This involves closing your eyes, scanning your body from head to toe, and noticing which parts of your body are particularly tense. Try to relax the muscles in those areas.  How will I feel when I quit smoking?  Day 1 to 3 weeks  Within the first 24 hours of quitting smoking, you may start to feel withdrawal symptoms. These symptoms are usually most noticeable 2-3 days after quitting, but they usually do not last for more than 2-3 weeks. You may experience these symptoms:  · Mood swings.  · Restlessness, anxiety, or irritability.  · Trouble concentrating.  · Dizziness.  · Strong cravings for sugary foods and nicotine.  · Mild weight gain.  · Constipation.  · Nausea.  · Coughing or a sore throat.  · Changes in how the medicines that you take for unrelated issues work in your body.  · Depression.  · Trouble sleeping (insomnia).  Week 3 and afterward  After the first 2-3 weeks of quitting, you may start to notice more positive results, such as:  · Improved sense of smell and taste.  · Decreased coughing and sore throat.  · Slower heart rate.  · Lower blood pressure.  · Clearer skin.  · The ability to breathe more easily.  · Fewer sick days.  Quitting smoking can be very challenging. Do not get discouraged if you are not successful the first time. Some people need to make many attempts to quit before they achieve long-term success. Do your best to stick to your quit plan, and talk with your health care provider if you have any questions or concerns.  Summary  · Smoking tobacco is the leading cause of preventable death. Quitting smoking is one of the best things that you can do for your health.  · When you decide to quit smoking, create a plan to help you succeed.  · Quit smoking right away, not slowly over a period of time.  · When you start quitting, seek help from your  health care provider, family, or friends.  This information is not intended to replace advice given to you by your health care provider. Make sure you discuss any questions you have with your health care provider.  Document Revised: 09/11/2020 Document Reviewed: 03/07/2020  Elsevier Patient Education © 2021 [employer]

## 2022-03-30 RX ORDER — VARENICLINE TARTRATE 1 MG/1
TABLET, FILM COATED ORAL
Qty: 180 TABLET | Refills: 3 | Status: SHIPPED | OUTPATIENT
Start: 2022-03-30 | End: 2022-05-02 | Stop reason: SDDI

## 2022-05-03 ENCOUNTER — PRIOR AUTHORIZATION (OUTPATIENT)
Dept: FAMILY MEDICINE CLINIC | Facility: CLINIC | Age: 62
End: 2022-05-03

## 2022-05-03 NOTE — TELEPHONE ENCOUNTER
Pa request sent for  MYRBETRIQ has been sent.  5/3/2022  5/5/2022   Reyna Mayfield ([place]: [id number]) - [phone]  Myrbetriq 50MG er tablets     Status: PA Response - Denied

## 2022-05-06 ENCOUNTER — TELEPHONE (OUTPATIENT)
Dept: FAMILY MEDICINE CLINIC | Facility: CLINIC | Age: 62
End: 2022-05-06

## 2022-05-06 NOTE — TELEPHONE ENCOUNTER
----- Message from Vanessa Mathew sent at 5/5/2022 12:38 PM CDT -----  Regarding: denied myrbetriq  A few days ago Hannah Andersen ask me to check on a PA for mybetriq for Mrs. Mayfield  I sent a request for The myrbetriq and it was denied.was denied.  The office should receive a denial letter.  I have notified Ms. Mayfield and ask her to call the office to see if Dr. Cochran wants to on a new medicine.    Julia

## 2022-10-07 ENCOUNTER — LAB (OUTPATIENT)
Dept: LAB | Facility: OTHER | Age: 62
End: 2022-10-07

## 2022-10-07 DIAGNOSIS — E78.2 MIXED HYPERLIPIDEMIA: Chronic | ICD-10-CM

## 2022-10-07 LAB
CHOLEST SERPL-MCNC: 229 MG/DL (ref 150–200)
HDLC SERPL-MCNC: 59 MG/DL (ref 40–59)
LDLC SERPL CALC-MCNC: 149 MG/DL
LDLC/HDLC SERPL: 2.49 {RATIO} (ref 0–3.22)
TRIGL SERPL-MCNC: 116 MG/DL
VLDLC SERPL-MCNC: 21 MG/DL (ref 5–40)

## 2022-10-07 PROCEDURE — 80061 LIPID PANEL: CPT | Performed by: INTERNAL MEDICINE

## 2022-10-07 PROCEDURE — 36415 COLL VENOUS BLD VENIPUNCTURE: CPT | Performed by: INTERNAL MEDICINE

## 2022-11-01 ENCOUNTER — OFFICE VISIT (OUTPATIENT)
Dept: FAMILY MEDICINE CLINIC | Facility: CLINIC | Age: 62
End: 2022-11-01

## 2022-11-01 VITALS
DIASTOLIC BLOOD PRESSURE: 68 MMHG | OXYGEN SATURATION: 99 % | HEIGHT: 63 IN | BODY MASS INDEX: 22.15 KG/M2 | HEART RATE: 74 BPM | TEMPERATURE: 97.3 F | WEIGHT: 125 LBS | SYSTOLIC BLOOD PRESSURE: 106 MMHG

## 2022-11-01 DIAGNOSIS — H60.311 ACUTE DIFFUSE OTITIS EXTERNA OF RIGHT EAR: ICD-10-CM

## 2022-11-01 DIAGNOSIS — F33.1 MAJOR DEPRESSIVE DISORDER, RECURRENT, MODERATE: Chronic | ICD-10-CM

## 2022-11-01 DIAGNOSIS — L82.0 INFLAMED SEBORRHEIC KERATOSIS: ICD-10-CM

## 2022-11-01 DIAGNOSIS — R53.83 FATIGUE, UNSPECIFIED TYPE: ICD-10-CM

## 2022-11-01 DIAGNOSIS — R07.2 PRECORDIAL PAIN: ICD-10-CM

## 2022-11-01 DIAGNOSIS — N32.81 OAB (OVERACTIVE BLADDER): Chronic | ICD-10-CM

## 2022-11-01 DIAGNOSIS — R06.09 DOE (DYSPNEA ON EXERTION): ICD-10-CM

## 2022-11-01 DIAGNOSIS — H60.312 ACUTE DIFFUSE OTITIS EXTERNA OF LEFT EAR: ICD-10-CM

## 2022-11-01 DIAGNOSIS — J43.2 CENTRILOBULAR EMPHYSEMA: Chronic | ICD-10-CM

## 2022-11-01 DIAGNOSIS — H61.23 BILATERAL IMPACTED CERUMEN: ICD-10-CM

## 2022-11-01 DIAGNOSIS — F41.1 GAD (GENERALIZED ANXIETY DISORDER): Chronic | ICD-10-CM

## 2022-11-01 DIAGNOSIS — Z72.0 TOBACCO ABUSE: Chronic | ICD-10-CM

## 2022-11-01 DIAGNOSIS — E78.2 MIXED HYPERLIPIDEMIA: Primary | Chronic | ICD-10-CM

## 2022-11-01 PROCEDURE — 69210 REMOVE IMPACTED EAR WAX UNI: CPT | Performed by: INTERNAL MEDICINE

## 2022-11-01 PROCEDURE — 99215 OFFICE O/P EST HI 40 MIN: CPT | Performed by: INTERNAL MEDICINE

## 2022-11-01 RX ORDER — NEOMYCIN SULFATE, POLYMYXIN B SULFATE AND HYDROCORTISONE 10; 3.5; 1 MG/ML; MG/ML; [USP'U]/ML
3 SUSPENSION/ DROPS AURICULAR (OTIC) 3 TIMES DAILY
Qty: 5 ML | Refills: 0 | Status: SHIPPED | OUTPATIENT
Start: 2022-11-01 | End: 2022-11-01 | Stop reason: SDUPTHER

## 2022-11-01 RX ORDER — BUPROPION HYDROCHLORIDE 150 MG/1
150 TABLET, EXTENDED RELEASE ORAL EVERY MORNING
Qty: 30 TABLET | Refills: 11 | Status: SHIPPED | OUTPATIENT
Start: 2022-11-01 | End: 2023-02-15 | Stop reason: SDUPTHER

## 2022-11-01 RX ORDER — LANOLIN ALCOHOL/MO/W.PET/CERES
1000 CREAM (GRAM) TOPICAL DAILY
COMMUNITY

## 2022-11-01 RX ORDER — FAMOTIDINE 20 MG/1
20 TABLET, FILM COATED ORAL NIGHTLY
Qty: 90 TABLET | Refills: 3 | Status: SHIPPED | OUTPATIENT
Start: 2022-11-01 | End: 2023-02-15 | Stop reason: SDUPTHER

## 2022-11-01 RX ORDER — NEOMYCIN SULFATE, POLYMYXIN B SULFATE AND HYDROCORTISONE 10; 3.5; 1 MG/ML; MG/ML; [USP'U]/ML
3 SUSPENSION/ DROPS AURICULAR (OTIC) 3 TIMES DAILY
Qty: 5 ML | Refills: 0 | Status: SHIPPED | OUTPATIENT
Start: 2022-11-01 | End: 2022-11-03 | Stop reason: SDUPTHER

## 2022-11-01 NOTE — PROGRESS NOTES
"Chief Complaint  Follow-up (6 month lab results) and Shortness of Breath (And sometimes chest pain and wants to check for heart disease that runs in her family)    Subjective        History of Present Illness     Reyna Mayfield is our 62-year-old female patient who presents to the office for 6-month follow up on hyperlipidemia and address multiple other issues.       With her annual exam  six months ago, her total cholesterol was 249 and LDL cholesterol wass far above goal at 172 without statin.  She admitted there was room for improvement with diet and we held off on starting statin therapy, at her request.  Her cholesterol numbers are improved, although, continue to be above goal with  and total cholesterol 229.  However, with her HDL of 59, she has  an acceptable cholesterol ratio 2.49.  She also continues to have the cardiovascular risk factor of continued tobacco use.  Recent CT scan suggests some degree of emphysema.  Patient reports a family history of CAD and is reporting daily episodes of chest pain with and without exertion and increased shortness of breath.  Patient continues Protonix 40 mg q.a.m. she denies frequent use of OTC NSAIDs.  OTC antacids relieves chest pain some of the time.  Recommended cardiology evaluation.  She is in agreement.  We will also add Pepcid in the evening to see if this relieves her symptoms.     Patient has a benign-appearing inflamed SK on the left upper eyelid, for which is very irritating.   Recommended dermatology evaluation.  She is in agreement.        Patient continues to smoke cigarettes and reports increased shortness of breath with exertion/CRESPO.  We sent a prescription for Chantix with her last visit, although, patient states she could not tolerate and  \"just did not feel right\" even with taking 1/2 tablet of the Chantix twice daily.  CT lung screen 02/24/2022 revealed no evidence of lung cancer, but did reveal evidence of some emphysema.  I have " "recommended pulmonary function testing.  She is not using any inhaler as of yet.      Lexapro is helping manage MALISSA/depressionand she normally sleeps well, although, admits there is room for improvement.   Adding Wellbutrin SR to her Lexapro may not only improve control of depression and MALISSA, but help with smoking cessation.      Patient reports ear pressure and pain bilateral ear canals, worse recently.  She has history of cerumen impactions and requires frequent ear irrigation.  Her daughter uses a home kit weekly to help her remove cerumen.  After I manually removed cerumen impactions today, there is evidence of bilateral otitis externa        Patient feels taking B-12 gummies have helped with fatigue symptoms.  We will check B-12 level with next set of labs in 6 months.      Patient has had gradual improvement of OAB symptoms after her hysterectomy 09/2021.  She has not required the Myrbetric.     Objective   Vital Signs:  /68   Pulse 74   Temp 97.3 °F (36.3 °C)   Ht 160 cm (63\")   Wt 56.7 kg (125 lb)   SpO2 99%   BMI 22.14 kg/m²   Estimated body mass index is 22.14 kg/m² as calculated from the following:    Height as of this encounter: 160 cm (63\").    Weight as of this encounter: 56.7 kg (125 lb).    BMI is within normal parameters. No other follow-up for BMI required.      Physical Exam  Vitals reviewed.   Constitutional:       General: She is not in acute distress.     Appearance: She is well-developed.   HENT:      Head: Normocephalic and atraumatic.      Ears:      Comments: Impacted cerumen bilateral ear canals removed manually in the office today with ear loop with evidence of otitis externa bilateral ear canals noted after cerumen removal.        Nose:      Right Sinus: No maxillary sinus tenderness or frontal sinus tenderness.      Left Sinus: No maxillary sinus tenderness or frontal sinus tenderness.      Mouth/Throat:      Mouth: No oral lesions.      Pharynx: Uvula midline.      Tonsils: " No tonsillar exudate.   Eyes:      Conjunctiva/sclera: Conjunctivae normal.      Pupils: Pupils are equal, round, and reactive to light.   Neck:      Thyroid: No thyroid mass or thyromegaly.      Vascular: No carotid bruit or JVD.      Trachea: Trachea normal. No tracheal deviation.   Cardiovascular:      Rate and Rhythm: Normal rate and regular rhythm.  No extrasystoles are present.     Chest Wall: PMI is not displaced.      Heart sounds: Normal heart sounds. No murmur heard.  Pulmonary:      Effort: Pulmonary effort is normal. No accessory muscle usage or respiratory distress.      Breath sounds: No decreased breath sounds, wheezing, rhonchi or rales.      Comments: Chronic lung sounds.  Abdominal:      General: Bowel sounds are normal. There is no distension.      Palpations: Abdomen is soft.      Tenderness: There is no abdominal tenderness.   Musculoskeletal:      Cervical back: Neck supple.   Lymphadenopathy:      Cervical: No cervical adenopathy.   Skin:     General: Skin is warm and dry.      Findings: No rash.      Nails: There is no clubbing.      Comments: Small, benign-appearing inflamed SK on the left upper eyelid   Neurological:      General: No focal deficit present.      Mental Status: She is alert and oriented to person, place, and time. Mental status is at baseline.      Cranial Nerves: No cranial nerve deficit.      Coordination: Coordination normal.   Psychiatric:         Speech: Speech normal.         Behavior: Behavior normal.         Thought Content: Thought content normal.         Judgment: Judgment normal.      Comments: Somewhat anxious.  Becomes slightly tearful a few times when we talked, particularly regarding inability to stop smoking cigarettes, which is frustrating to her          Result Review :    Lipid Panel    Lipid Panel 2/9/22 10/7/22   Total Cholesterol 249 (A) 229 (A)   Triglycerides 112 116   HDL Cholesterol 57 59   VLDL Cholesterol 20 21   LDL Cholesterol  172 (A) 149 (A)    LDL/HDL Ratio 2.98 2.49   (A) Abnormal value               Ear Cerumen Removal    Date/Time: 11/1/2022 3:40 PM  Performed by: Nitin Cochran MD  Authorized by: Nitin Cochran MD     Anesthesia:  Local Anesthetic: none  Ceruminolytics applied: Ceruminolytics applied prior to the procedure.  Location details: left ear and right ear  Patient tolerance: patient tolerated the procedure well with no immediate complications  Comments: After obtaining verbal informed consent, the bilateral cerumen was removed manually with curette followed by Q-tip with excellent results.  Patient tolerated adequately with some discomfort.  No complications.  Total procedure time: 10 minutes  Procedure type: instrumentation, curette   Sedation:  Patient sedated: no               Assessment and Plan   Diagnoses and all orders for this visit:    1. Mixed hyperlipidemia (Primary)  -     CBC Auto Differential; Future  -     Comprehensive Metabolic Panel; Future  -     Lipid Panel; Future  -     TSH; Future    2. Centrilobular emphysema (HCC)  -     Full Pulmonary Function Test With Bronchodilator; Future  -     CBC Auto Differential; Future  -     Comprehensive Metabolic Panel; Future    3. Major depressive disorder, recurrent, moderate (HCC)    4. MALISSA (generalized anxiety disorder)    5. OAB (overactive bladder)    6. CRESPO (dyspnea on exertion)  -     Full Pulmonary Function Test With Bronchodilator; Future  -     Ambulatory Referral to Cardiology    7. Precordial pain  -     Ambulatory Referral to Cardiology    8. Inflamed seborrheic keratosis  -     Ambulatory Referral to Dermatology    9. Bilateral impacted cerumen    10. Acute diffuse otitis externa of left ear    11. Acute diffuse otitis externa of right ear    12. tobacco abuse - 1ppd    13. Fatigue, unspecified type  -     Vitamin B12; Future    Other orders  -     buPROPion SR (Wellbutrin SR) 150 MG 12 hr tablet; Take 1 tablet by mouth Every Morning.  Dispense: 30 tablet; Refill:  11  -     famotidine (PEPCID) 20 MG tablet; Take 1 tablet by mouth Every Night.  Dispense: 90 tablet; Refill: 3  -     Discontinue: neomycin-polymyxin-hydrocortisone (CORTISPORIN) 3.5-22158-8 otic suspension; Administer 3 drops into both ears 3 (Three) Times a Day.  Dispense: 5 mL; Refill: 0  -     neomycin-polymyxin-hydrocortisone (CORTISPORIN) 3.5-04526-6 otic suspension; Administer 3 drops into both ears 3 (Three) Times a Day.  Dispense: 5 mL; Refill: 0           I spent 44 minutes caring for Reyna on this date of service. This time includes time spent by me in the following activities:preparing for the visit, reviewing tests, performing a medically appropriate examination and/or evaluation , counseling and educating the patient/family/caregiver, ordering medications, tests, or procedures, referring and communicating with other health care professionals  and documenting information in the medical record.  This does not include time spent on her procedure today.    Schedule PFT's to evaluate dyspnea on exertion in this patient who continues to smoke and refer to Dr. Villarreal, cardiologist, for evaluation of chest pain and dyspnea on exertion.  I advised the patient of the risks in continuing to use tobacco products.  Patient is urged to stop smoking and never start again. She is motivated to stop smoking, but states she did not tolerate Chantix well (just didn't feel right).  Start some Wellbutrin SR in the a.m.  After she has been on the Wellbutrin for 3 to 4 weeks, she may want to retry the Chantix 1/2 tablet daily, as even a small amount combined with Wellbutrin may be helpful. Prescription sent for  Pepcid 20 mg to take one after work each evening to see if this helps relieve atypical chest pain, possible GI etiology. Continue the Protonix 40 mg q.a.m. avoid NSAIDs.    If cardiology evaluation reveals any evidence of CAD, she will need statin therapy.  If Dr. Villarreal feels that her risk factors indicate the need  to initiate statin therapy, I will certainly be in agreement.    To address the bilateral otitis externa, prescription is sent for cortisporin otic to administer 3 drops each ear canal t.i.d. for 1 week    Continue to use the OTC earwax kit every couple weeks to help prevent reaccumulation of excessive earwax.    She can continue the OTC B-12 gummies for now.  We will check a B-12 level with next set of labs.     Refer to [facility] dermatology in [place], to address the inflamed SK left upper eyelid.      Return in 6 months for routine follow up with fasting labs one week prior or sooner if needed.     Scribed for Dr. Cochran by Mihaela Calderón [facility].     Follow Up   Return in 6 months (on 5/1/2023) for Next scheduled follow up - labs 1 week prior, Follow up in six months with labs one week prior..  Patient was given instructions and counseling regarding her condition or for health maintenance advice. Please see specific information pulled into the AVS if appropriate.

## 2022-11-03 DIAGNOSIS — H61.23 BILATERAL IMPACTED CERUMEN: Primary | ICD-10-CM

## 2022-11-03 DIAGNOSIS — H60.93 OTITIS EXTERNA OF BOTH EARS, UNSPECIFIED CHRONICITY, UNSPECIFIED TYPE: ICD-10-CM

## 2022-11-03 RX ORDER — NEOMYCIN SULFATE, POLYMYXIN B SULFATE AND HYDROCORTISONE 10; 3.5; 1 MG/ML; MG/ML; [USP'U]/ML
3 SUSPENSION/ DROPS AURICULAR (OTIC) 3 TIMES DAILY
Qty: 10 ML | Refills: 0 | Status: SHIPPED | OUTPATIENT
Start: 2022-11-03 | End: 2023-02-15

## 2022-11-03 NOTE — TELEPHONE ENCOUNTER
Received a fax request from [facility] [employer] to change the quantity of the cortisporin ear drops sent to them on 11/1/22.  Per [facility] the quantity on the script is less than the available pack size. Please write a quantity as a multiple of 10 ml.

## 2022-12-14 DIAGNOSIS — K21.00 GASTROESOPHAGEAL REFLUX DISEASE WITH ESOPHAGITIS WITHOUT HEMORRHAGE: Primary | Chronic | ICD-10-CM

## 2022-12-15 RX ORDER — PANTOPRAZOLE SODIUM 40 MG/1
TABLET, DELAYED RELEASE ORAL
Qty: 90 TABLET | Refills: 3 | Status: SHIPPED | OUTPATIENT
Start: 2022-12-15

## 2023-01-16 RX ORDER — ESCITALOPRAM OXALATE 10 MG/1
TABLET ORAL
Qty: 90 TABLET | Refills: 3 | Status: SHIPPED | OUTPATIENT
Start: 2023-01-16

## 2023-02-15 ENCOUNTER — OFFICE VISIT (OUTPATIENT)
Dept: FAMILY MEDICINE CLINIC | Facility: CLINIC | Age: 63
End: 2023-02-15
Payer: OTHER GOVERNMENT

## 2023-02-15 VITALS
HEIGHT: 63 IN | SYSTOLIC BLOOD PRESSURE: 110 MMHG | TEMPERATURE: 98.9 F | WEIGHT: 127.4 LBS | HEART RATE: 68 BPM | DIASTOLIC BLOOD PRESSURE: 80 MMHG | BODY MASS INDEX: 22.57 KG/M2

## 2023-02-15 DIAGNOSIS — F33.1 MAJOR DEPRESSIVE DISORDER, RECURRENT, MODERATE: Chronic | ICD-10-CM

## 2023-02-15 DIAGNOSIS — K59.04 CHRONIC IDIOPATHIC CONSTIPATION: Primary | Chronic | ICD-10-CM

## 2023-02-15 DIAGNOSIS — K21.00 GASTROESOPHAGEAL REFLUX DISEASE WITH ESOPHAGITIS, UNSPECIFIED WHETHER HEMORRHAGE: Chronic | ICD-10-CM

## 2023-02-15 DIAGNOSIS — Z72.0 TOBACCO ABUSE: Chronic | ICD-10-CM

## 2023-02-15 DIAGNOSIS — R19.8 CHANGE IN BOWEL FUNCTION: ICD-10-CM

## 2023-02-15 DIAGNOSIS — R10.30 LOWER ABDOMINAL PAIN: ICD-10-CM

## 2023-02-15 DIAGNOSIS — F41.1 GAD (GENERALIZED ANXIETY DISORDER): Chronic | ICD-10-CM

## 2023-02-15 PROCEDURE — 99214 OFFICE O/P EST MOD 30 MIN: CPT | Performed by: INTERNAL MEDICINE

## 2023-02-15 RX ORDER — SCOLOPAMINE TRANSDERMAL SYSTEM 1 MG/1
1 PATCH, EXTENDED RELEASE TRANSDERMAL
Qty: 10 EACH | Refills: 0 | Status: SHIPPED | OUTPATIENT
Start: 2023-02-15

## 2023-02-15 RX ORDER — BUPROPION HYDROCHLORIDE 150 MG/1
150 TABLET, EXTENDED RELEASE ORAL EVERY MORNING
Qty: 90 TABLET | Refills: 3 | Status: SHIPPED | OUTPATIENT
Start: 2023-02-15

## 2023-02-15 RX ORDER — FAMOTIDINE 20 MG/1
20 TABLET, FILM COATED ORAL NIGHTLY
Qty: 90 TABLET | Refills: 3 | Status: SHIPPED | OUTPATIENT
Start: 2023-02-15

## 2023-02-15 NOTE — PROGRESS NOTES
Chief Complaint  Follow-up ([facility] ER 02/05/23 for abdominal pain and low back. She was dx with back pain and constipation. She states her abdomen is very sensitive. She still hasnt had a normal BM . She also has had bowel changes ) and Med Refill (Wellbutrin)    Subjective        History of Present Illness     Reyna Mafyield presents to the office for ER follow up.  She was seen in ER at [facility] 02/05/2023 for lower back and lower abdomen pain/nausea for three days.  Her workup was reassuring with a normal CBC and renal function. Urine negative for UTI and CT did not reveal stone or hydronephrosis. Patient was treated for constipation with lactulose, but no indication for antibiotics.  She has been out of Pepcid and only taking Protonix. She was given Toradol injection for any musculoskeletal component, as she had been lifting her  while caring for him.    She reports change in bowel movements as well as constipation.  Denies visualizing blood in stool.  She had a BM yesterday.  She is taking Metamucil episodically and has tried to increase fiber therapy. She is due colon cancer screening.  Colonoscopy 11/2017 by Dr. Harris GI, showed internal/external hemorrhoids, polyp in the rectum was hyperplastic.  Random colon biopsy negative. Multiple diverticuli in the sigmoid colon.  Repeat colonoscopy recommended in 5 years.  Her weight is up 2 pounds in past three months.       She has also been out of Wellbutrin, which she was taking to help with smoking cessation and is requesting a refill. She continues to smoke approximately 1 pack daily.  I advised the patient of the risks in continuing to use tobacco products.  Patient is urged to stop smoking and never start again.          When she was in last fall, we referred to cardiology and for PFTs to address dyspnea on exertion.  She admits she did not get these scheduled due to family issues, although, she states the dsypnea on exertion has resolved.      She has  "a couple of cruises planned and is requesting prescription for scopolamine patches to help with nausea/motion sickness.           Objective   Vital Signs:  /80   Pulse 68   Temp 98.9 °F (37.2 °C)   Ht 160 cm (63\")   Wt 57.8 kg (127 lb 6.4 oz)   BMI 22.57 kg/m²   Estimated body mass index is 22.57 kg/m² as calculated from the following:    Height as of this encounter: 160 cm (63\").    Weight as of this encounter: 57.8 kg (127 lb 6.4 oz).       BMI is within normal parameters. No other follow-up for BMI required.      Physical Exam  Vitals reviewed.   Constitutional:       General: She is not in acute distress.     Appearance: She is well-developed.   HENT:      Head: Normocephalic and atraumatic.      Nose:      Right Sinus: No maxillary sinus tenderness or frontal sinus tenderness.      Left Sinus: No maxillary sinus tenderness or frontal sinus tenderness.      Mouth/Throat:      Mouth: No oral lesions.      Pharynx: Uvula midline.      Tonsils: No tonsillar exudate.   Eyes:      Conjunctiva/sclera: Conjunctivae normal.      Pupils: Pupils are equal, round, and reactive to light.   Neck:      Thyroid: No thyroid mass or thyromegaly.      Vascular: No carotid bruit or JVD.      Trachea: Trachea normal. No tracheal deviation.   Cardiovascular:      Rate and Rhythm: Normal rate and regular rhythm.  No extrasystoles are present.     Chest Wall: PMI is not displaced.      Heart sounds: Normal heart sounds. No murmur heard.  Pulmonary:      Effort: Pulmonary effort is normal. No accessory muscle usage or respiratory distress.      Breath sounds: Normal breath sounds. No decreased breath sounds, wheezing, rhonchi or rales.      Comments: Chronic lung sounds.   Abdominal:      General: Bowel sounds are normal. There is no distension.      Palpations: Abdomen is soft.      Tenderness: There is no abdominal tenderness.   Musculoskeletal:      Cervical back: Neck supple.   Lymphadenopathy:      Cervical: No cervical " adenopathy.   Skin:     General: Skin is warm and dry.      Findings: No rash.      Nails: There is no clubbing.   Neurological:      Mental Status: She is alert and oriented to person, place, and time.      Cranial Nerves: No cranial nerve deficit.      Coordination: Coordination normal.   Psychiatric:         Speech: Speech normal.         Behavior: Behavior normal.         Thought Content: Thought content normal.         Judgment: Judgment normal.            Result Review :      Data reviewed: Consultant notes Colonoscopy 11/2017 by Dr. Harris , GI and ER at [facility] 02/05/2023    Future Appointments   Date Time Provider Department Center   5/3/2023  3:00 PM Nitin Cochran MD [facility]            Assessment and Plan   Diagnoses and all orders for this visit:    1. Chronic idiopathic constipation (Primary)  -     Ambulatory Referral to Gastroenterology    2. Change in bowel function  -     Ambulatory Referral to Gastroenterology    3. Lower abdominal pain  -     Ambulatory Referral to Gastroenterology    4. tobacco abuse - 1ppd    5. MALISSA (generalized anxiety disorder)    6. Major depressive disorder, recurrent, moderate (HCC)    7. Gastroesophageal reflux disease with esophagitis, unspecified whether hemorrhage  -     Ambulatory Referral to Gastroenterology    Other orders  -     buPROPion SR (Wellbutrin SR) 150 MG 12 hr tablet; Take 1 tablet by mouth Every Morning.  Dispense: 90 tablet; Refill: 3  -     Scopolamine 1 MG/3DAYS patch; Place 1 patch on the skin as directed by provider Every 72 (Seventy-Two) Hours.  Dispense: 10 each; Refill: 0           I spent 32 minutes caring for Reyna on this date of service. This time includes time spent by me in the following activities:preparing for the visit, reviewing tests, performing a medically appropriate examination and/or evaluation , counseling and educating the patient/family/caregiver, ordering medications, tests, or procedures, referring and communicating with  other health care professionals  and documenting information in the medical record     To help address constipation issues, recommended Metamucil or Benefiber 2 to 3 tsp daily increasing dose if needed with Senokot S when she goes 2 to 3 days without BM.  Hydrate well.  Increase dietary fiber as well.   We will refer back to Dr. Harris for 5-year repeat colonoscopy.      Prescription sent for scopolamine patches to use as directed for motion sickness on upcoming cruises.       Refill sent for Wellbutrin to take 150 mg daily to help with smoking cessation.  I advised the patient of the risks in continuing to use tobacco products.  Patient is urged to stop smoking and never start again.  She reports the dyspnea on exertion she reported last November has resolved.  She never scheduled the PFTs or cardiology appointment.        Return 05/03/2023 for routine follow up with fasting labs one week prior or sooner if needed.      Scribed for Dr. Cochran by Mihaela Calderón [facility].       Follow Up   Return if symptoms worsen or fail to improve, for Next scheduled follow up.  Patient was given instructions and counseling regarding her condition or for health maintenance advice. Please see specific information pulled into the AVS if appropriate.

## 2023-02-22 ENCOUNTER — TELEPHONE (OUTPATIENT)
Dept: FAMILY MEDICINE CLINIC | Facility: CLINIC | Age: 63
End: 2023-02-22
Payer: OTHER GOVERNMENT

## 2023-02-22 DIAGNOSIS — Z12.2 SCREENING FOR LUNG CANCER: ICD-10-CM

## 2023-02-22 DIAGNOSIS — Z12.31 ENCOUNTER FOR SCREENING MAMMOGRAM FOR MALIGNANT NEOPLASM OF BREAST: Primary | ICD-10-CM

## 2023-02-22 NOTE — TELEPHONE ENCOUNTER
----- Message from Nitin Cochran MD sent at 2/22/2023 11:07 AM CST -----    ----- Message -----  From: Sandra Terry RN  Sent: 2/22/2023  10:36 AM CST  To: Nitin Cochran MD

## 2023-03-09 ENCOUNTER — TELEPHONE (OUTPATIENT)
Dept: FAMILY MEDICINE CLINIC | Facility: CLINIC | Age: 63
End: 2023-03-09
Payer: OTHER GOVERNMENT

## 2023-03-09 NOTE — TELEPHONE ENCOUNTER
----- Message from Nitin Cochran MD sent at 3/8/2023  4:21 PM CST -----  Please inform Reyna that the lung cancer screen reveals no evidence of lung cancer.  It does reveal emphysema changes to the lungs.  She really needs to stop smoking entirely and never smoked again.  EB

## 2023-03-09 NOTE — TELEPHONE ENCOUNTER
I read Dr Cochran's message to the patient. The patient voiced understanding, had no additional questions, and thanked me for the call.   She advised she is taking the Wellbutrin now for the last week and is now smoking 3/4 of a pack daily.

## 2023-04-11 ENCOUNTER — OFFICE VISIT (OUTPATIENT)
Dept: GASTROENTEROLOGY | Facility: CLINIC | Age: 63
End: 2023-04-11
Payer: OTHER GOVERNMENT

## 2023-04-11 VITALS
WEIGHT: 126 LBS | BODY MASS INDEX: 22.32 KG/M2 | HEIGHT: 63 IN | OXYGEN SATURATION: 96 % | HEART RATE: 73 BPM | DIASTOLIC BLOOD PRESSURE: 58 MMHG | SYSTOLIC BLOOD PRESSURE: 92 MMHG

## 2023-04-11 DIAGNOSIS — Z86.010 HISTORY OF COLON POLYPS: Primary | ICD-10-CM

## 2023-04-11 DIAGNOSIS — K21.00 GASTROESOPHAGEAL REFLUX DISEASE WITH ESOPHAGITIS WITHOUT HEMORRHAGE: ICD-10-CM

## 2023-04-11 DIAGNOSIS — K57.90 DIVERTICULOSIS: ICD-10-CM

## 2023-04-11 RX ORDER — DEXTROSE AND SODIUM CHLORIDE 5; .45 G/100ML; G/100ML
30 INJECTION, SOLUTION INTRAVENOUS CONTINUOUS PRN
OUTPATIENT
Start: 2023-04-11

## 2023-04-11 RX ORDER — POLYETHYLENE GLYCOL 3350 17 G/17G
17 POWDER, FOR SOLUTION ORAL ONCE
Qty: 250 G | Refills: 0 | Status: SHIPPED | OUTPATIENT
Start: 2023-04-11 | End: 2023-04-11

## 2023-04-11 NOTE — PROGRESS NOTES
Chief Complaint   Patient presents with   • Chronic Constipation   • Change in bowel habits   • Abdominal Pain     Referred By: Dr. Nitin Cochran       ENDO PROCEDURE ORDERED: COLON hx polyp    Subjective    Reyna Mayfield is a 62 y.o. female. she is being seen for consultation today at the request of Nitin Cochran MD    History of Present Illness    This 62-year-old [employer]  was sent for evaluation for abdominal pain and constipation by Dr. Cochran who saw the patient on 02/15/2023. The patient had had an ER visit on 2023. Urinalysis had showed trace of abnormalities. Normal magnesium, lipase, CMP, CBC. CT abdomen and pelvis without contrast showed post cholecystectomy, hysterectomy, increased stool, mild fat stranding from the cecum to the ascending colon. She had a low dose CT scan of the chest showing emphysema fibrotic changes on the left on 2023. I had previously seen the patient on 2017 with GERD, abdominal pain, diverticular disease. She has undergone EGD, colonoscopy on 11/10/2017 showing esophagitis, gastritis, hemorrhoids, hyperplastic polyp, and diverticulosis. She currently denies abdominal pain. GERD is doing well on the Pepcid and Protonix. Denied nausea, vomiting and dysphagia. Bowels are moving without blood or mucus. Weight is up 4 pounds since last visit.    The patient is a smoker, 1 pack a day, strongly urged to quit. Denies alcohol or illicit substance use. She has had a hysterectomy, cholecystectomy, shoulder surgery. Family history of diabetes and heart disease. Father  at age 94 of diabetes. Mother  at age of 80 of heart disease. Spouse in good health,  42 years. Two brothers, 1 sister and 3 children in good health.    ASSESSMENT/PLAN: Patient with longstanding GERD. Recent abdominal pain requiring hospital visit with probable colitis, possibly diverticulitis, currently improved, recommend colonoscopy to evaluate. She does have history of  polyps and is due for surveillance colonoscopy. She should not go more than 5 years between screenings. WE will plan further pending clinical course and the results of the above.    Thank you very much, Dr. Cochran, for involving us in the care of your patient. We will keep you informed.        The following portions of the patient's history were reviewed and updated as appropriate:   Past Medical History:   Diagnosis Date   • Drug withdrawal    • Functional diarrhea 9/19/2017    Added automatically from request for surgery [id number]   • MALISSA (generalized anxiety disorder)    • Gastroesophageal reflux disease with esophagitis    • GERD (gastroesophageal reflux disease)    • History of colonic polyps    • Hx of hysterectomy, total 09/29/2021    [facility].    • Insomnia    • Major depressive disorder, recurrent, moderate    • Mixed hyperlipidemia 8/1/2018   • Pain in pelvis     Discomfort more than pain      • Pelvic floor relaxation    • Sleep disorder 2/28/2018   • Tobacco abuse 7/18/2017     Past Surgical History:   Procedure Laterality Date   • COLONOSCOPY  2011    yearly due to polyps   • COLONOSCOPY N/A 11/10/2017    Procedure: COLONOSCOPY;  Surgeon: Power Harris MD;  Location: [facility] ENDOSCOPY;  Service:    • ENDOSCOPY N/A 11/10/2017    Procedure: ESOPHAGOGASTRODUODENOSCOPY (u/s prior @1045) ;  Surgeon: Power Harris MD;  Location: [facility] ENDOSCOPY;  Service:    • ESOPHAGUS SURGERY     • HYSTERECTOMY  09/23/2021    w BSO, [facility] Dr. AUGUSTO Bravo In   • LAPAROSCOPIC CHOLECYSTECTOMY      @ 21 y/o   • OOPHORECTOMY  09/23/2021   • OTHER SURGICAL HISTORY  02/26/2014    Remove Impacted Cerumen 78834 (1)      • PAP SMEAR  02/03/2015    normal   • SHOULDER SURGERY Left 2008   • TUBAL ABDOMINAL LIGATION  2006   • UPPER GASTROINTESTINAL ENDOSCOPY  11/10/2017     Family History   Problem Relation Age of Onset   • Diabetes Mother    • Heart disease Mother    •  Diabetes Father    • Osteoporosis Sister    • Breast cancer Sister    • Colon cancer Neg Hx    • Stomach cancer Neg Hx      OB History        3    Para   3    Term   3            AB        Living   3       SAB        IAB        Ectopic        Molar        Multiple        Live Births                  No Known Allergies  Social History     Socioeconomic History   • Marital status:    Tobacco Use   • Smoking status: Every Day     Packs/day: 1.00     Years: 35.00     Pack years: 35.00     Types: Cigarettes     Passive exposure: Current   • Smokeless tobacco: Never   Vaping Use   • Vaping Use: Never used   Substance and Sexual Activity   • Alcohol use: No   • Drug use: No   • Sexual activity: Not Currently     Partners: Male     Birth control/protection: Hysterectomy     Current Medications:  Prior to Admission medications    Medication Sig Start Date End Date Taking? Authorizing Provider   aspirin 81 MG chewable tablet Chew 1 tablet Daily.   Yes Kyler Espana MD   buPROPion SR (Wellbutrin SR) 150 MG 12 hr tablet Take 1 tablet by mouth Every Morning. 2/15/23  Yes Nitin Cochran MD   ELDERBERRY PO Take  by mouth.   Yes Kyler Espana MD   escitalopram (LEXAPRO) 10 MG tablet TAKE 1 TABLET DAILY 23  Yes Nitin Cochran MD   famotidine (PEPCID) 20 MG tablet Take 1 tablet by mouth Every Night. 2/15/23  Yes Nitin Cochran MD   Multiple Vitamins-Calcium (ONE-A-DAY WOMENS PO) Take  by mouth Daily.   Yes Kyler Espana MD   pantoprazole (PROTONIX) 40 MG EC tablet TAKE 1 TABLET DAILY 12/15/22  Yes Nitin Cochran MD   POTASSIUM PO Take  by mouth.   Yes Kyler Espana MD   vitamin B-12 (CYANOCOBALAMIN) 1000 MCG tablet Take 1 tablet by mouth Daily.   Yes Kyler Espana MD   polyethylene glycol (MiraLax) 17 GM/SCOOP powder Take 17 g by mouth 1 (One) Time for 1 dose. Instructions per instruction sheet. 23  Renato Ugarte PA-C   Scopolamine 1 MG/3DAYS patch  "Place 1 patch on the skin as directed by provider Every 72 (Seventy-Two) Hours.  Patient not taking: Reported on 4/11/2023 2/15/23   Nitin Cochran MD     Review of Systems  Review of Systems   Constitutional: Negative for unexpected weight change.   HENT: Negative for trouble swallowing.    Gastrointestinal: Positive for abdominal pain. Negative for anal bleeding, blood in stool, constipation, diarrhea, nausea, rectal pain and vomiting.          Objective    BP 92/58 (BP Location: Right arm, Patient Position: Sitting)   Pulse 73   Ht 160 cm (63\")   Wt 57.2 kg (126 lb)   LMP  (LMP Unknown) Comment: Age 44  SpO2 96%   BMI 22.32 kg/m²   Physical Exam  Vitals and nursing note reviewed.   Constitutional:       General: She is not in acute distress.     Appearance: She is well-developed. She is ill-appearing.   HENT:      Head: Normocephalic and atraumatic.   Eyes:      Pupils: Pupils are equal, round, and reactive to light.   Cardiovascular:      Rate and Rhythm: Normal rate and regular rhythm.      Heart sounds: Normal heart sounds.   Pulmonary:      Effort: Pulmonary effort is normal.      Breath sounds: Normal breath sounds.   Abdominal:      General: Bowel sounds are normal. There is no distension or abdominal bruit.      Palpations: Abdomen is soft. Abdomen is not rigid. There is no shifting dullness or mass.      Tenderness: There is abdominal tenderness. There is no guarding or rebound.      Hernia: No hernia is present. There is no hernia in the ventral area.   Musculoskeletal:         General: Normal range of motion.      Cervical back: Normal range of motion.   Skin:     General: Skin is warm and dry.   Neurological:      Mental Status: She is alert and oriented to person, place, and time.   Psychiatric:         Behavior: Behavior normal.         Thought Content: Thought content normal.         Judgment: Judgment normal.       Assessment & Plan      1. History of colon polyps    2. Gastroesophageal " reflux disease with esophagitis without hemorrhage    3. Diverticulosis    .   Diagnoses and all orders for this visit:    1. History of colon polyps (Primary)  -     Case Request; Standing  -     dextrose 5 % and sodium chloride 0.45 % infusion  -     Case Request    2. Gastroesophageal reflux disease with esophagitis without hemorrhage    3. Diverticulosis    Other orders  -     polyethylene glycol (MiraLax) 17 GM/SCOOP powder; Take 17 g by mouth 1 (One) Time for 1 dose. Instructions per instruction sheet.  Dispense: 250 g; Refill: 0  -     Follow Anesthesia Guidelines / Protocol; Future  -     Obtain Informed Consent; Future  -     Obtain Informed Consent; Standing  -     POC Glucose Once; Standing        Orders placed during this encounter include:  Orders Placed This Encounter   Procedures   • Obtain Informed Consent     Standing Status:   Future     Order Specific Question:   Informed Consent Given For     Answer:   COLONOSCOPY       Medications prescribed:  New Medications Ordered This Visit   Medications   • polyethylene glycol (MiraLax) 17 GM/SCOOP powder     Sig: Take 17 g by mouth 1 (One) Time for 1 dose. Instructions per instruction sheet.     Dispense:  250 g     Refill:  0       Requested Prescriptions     Signed Prescriptions Disp Refills   • polyethylene glycol (MiraLax) 17 GM/SCOOP powder 250 g 0     Sig: Take 17 g by mouth 1 (One) Time for 1 dose. Instructions per instruction sheet.       Review and/or summary of lab tests, radiology, procedures, medications. Review and summary of old records and obtaining of history. The risks and benefits of my recommendations, as well as other treatment options were discussed with the patient today. Questions were answered.    Follow-up: Return if symptoms worsen or fail to improve.     COLONOSCOPY (N/A)      This document has been electronically signed by Renato Ugarte PA-C on April 21, 2023 18:12 CDT      Results for orders placed or performed in visit on  23   LIQUID-BASED PAP SMEAR, [facility] ([facility])    Specimen: Cervix; ThinPrep Vial   Result Value Ref Range    Reference Lab Report       [facility]  [address] [place] [address]  Phone: [phone] or [phone]  Fax: [phone]  Garrett Woods M.D., Medical Director    PATIENT NAME                                     LABORATORY [address].  [id number]                             [id number]  [id number]                                 AGE                    SEX   SSN              CLIENT REF #  [facility]               62        1960      F     [id number]      [id number]    [facility]                  REQUESTING M.D.           ATTENDING [place] [address]                         EPHRAIM SWEET  DATE COLLECTED            DATE RECEIVED          DATE REPORTED  2023    ThinPrep Pap with [facility]    DIAGNOSIS:  Negative for intraepithelial lesion or malignancy    Multiple factors can influence accuracy of Pap tests; therefore, screening  at  regular intervals is necessary for early cancer detection.      SPECIMEN ADEQUACY:  SATISFACTORY FOR EVALUATION  Transformation zone is present.  SOURCE OF SPECIMEN:       VAGINAL/CERVICAL  SLIDES:  1  CLINICAL HISTORY:  Encounter for well woman exam with routine gynecological exam  Post Menopausal    CYTOTECHNOLOGIST:             LILLIAN ANDREWS(ASCP)    CPT CODES:  34163     Urinalysis With Culture If Indicated - Urine, Clean Catch    Specimen: Urine, Clean Catch   Result Value Ref Range    Color, UA Yellow Yellow, Straw    Appearance, UA Clear Clear    pH, UA 7.5 5.5 - 8.0    Specific Gravity, UA 1.010 1.005 - 1.030    Glucose, UA Negative Negative    Ketones, UA Negative Negative    Bilirubin, UA Negative Negative    Blood, UA Negative Negative    Protein, UA Negative Negative    Leuk  Esterase, UA Negative Negative    Nitrite, UA Negative Negative    Urobilinogen, UA 0.2 E.U./dL 0.2 - 1.0 E.U./dL   Results for orders placed or performed in visit on 10/07/22   Lipid Panel    Specimen: Blood   Result Value Ref Range    Total Cholesterol 229 (H) 150 - 200 mg/dL    Triglycerides 116 <=150 mg/dL    HDL Cholesterol 59 40 - 59 mg/dL    LDL Cholesterol  149 (H) <=100 mg/dL    VLDL Cholesterol 21 5 - 40 mg/dL    LDL/HDL Ratio 2.49 0.00 - 3.22   Results for orders placed or performed during the hospital encounter of 06/13/22   POC Urinalysis Dipstick, Multipro (Automated dipstick)    Specimen: Urine   Result Value Ref Range    Color Yellow Yellow, Straw, Dark Yellow, Laurie    Clarity, UA Clear Clear    Glucose, UA Negative Negative, 1000 mg/dL (3+) mg/dL    Bilirubin Negative Negative    Ketones, UA Negative Negative    Specific Gravity  1.025 1.005 - 1.030    Blood, UA 3+ (A) Negative    pH, Urine 7.5 5.0 - 8.0    Protein, POC 2+ (A) Negative mg/dL    Urobilinogen, UA Normal Normal    Nitrite, UA Negative Negative    Leukocytes Trace (A) Negative   Results for orders placed or performed in visit on 02/09/22   CBC Auto Differential    Specimen: Blood   Result Value Ref Range    WBC 5.44 3.40 - 10.80 10*3/mm3    RBC 4.78 3.77 - 5.28 10*6/mm3    Hemoglobin 13.7 12.0 - 15.9 g/dL    Hematocrit 42.3 34.0 - 46.6 %    MCV 88.5 79.0 - 97.0 fL    MCH 28.7 26.6 - 33.0 pg    MCHC 32.4 31.5 - 35.7 g/dL    RDW 14.2 12.3 - 15.4 %    RDW-SD 45.1 37.0 - 54.0 fl    MPV 9.9 6.0 - 12.0 fL    Platelets 186 140 - 450 10*3/mm3    Neutrophil % 54.7 42.7 - 76.0 %    Lymphocyte % 35.8 19.6 - 45.3 %    Monocyte % 6.4 5.0 - 12.0 %    Eosinophil % 2.0 0.3 - 6.2 %    Basophil % 1.1 0.0 - 1.5 %    Neutrophils, Absolute 2.97 1.70 - 7.00 10*3/mm3    Lymphocytes, Absolute 1.95 0.70 - 3.10 10*3/mm3    Monocytes, Absolute 0.35 0.10 - 0.90 10*3/mm3    Eosinophils, Absolute 0.11 0.00 - 0.40 10*3/mm3    Basophils, Absolute 0.06 0.00 - 0.20  10*3/mm3   TSH    Specimen: Blood   Result Value Ref Range    TSH 2.520 0.270 - 4.200 uIU/mL   Lipid Panel    Specimen: Blood   Result Value Ref Range    Total Cholesterol 249 (H) 150 - 200 mg/dL    Triglycerides 112 <=150 mg/dL    HDL Cholesterol 57 40 - 59 mg/dL    LDL Cholesterol  172 (H) <=100 mg/dL    VLDL Cholesterol 20 5 - 40 mg/dL    LDL/HDL Ratio 2.98 0.00 - 3.22   Comprehensive Metabolic Panel    Specimen: Blood   Result Value Ref Range    Glucose 99 70 - 99 mg/dL    BUN 6 (L) 7 - 23 mg/dL    Creatinine 0.53 0.52 - 1.04 mg/dL    Sodium 139 137 - 145 mmol/L    Potassium 4.1 3.4 - 5.0 mmol/L    Chloride 104 101 - 112 mmol/L    CO2 31.0 (H) 22.0 - 30.0 mmol/L    Calcium 8.9 8.4 - 10.2 mg/dL    Total Protein 7.1 6.3 - 8.6 g/dL    Albumin 4.20 3.50 - 5.00 g/dL    ALT (SGPT) 19 <=35 U/L    AST (SGOT) 30 14 - 36 U/L    Alkaline Phosphatase 79 38 - 126 U/L    Total Bilirubin 0.4 0.2 - 1.3 mg/dL    eGFR Non  Amer 118 51 - 120 mL/min/1.73    Globulin 2.9 2.3 - 3.5 gm/dL    A/G Ratio 1.4 1.1 - 1.8 g/dL    BUN/Creatinine Ratio 11.3 7.0 - 25.0    Anion Gap 4.0 (L) 5.0 - 15.0 mmol/L   Results for orders placed or performed in visit on 06/25/21   POC Urinalysis Dipstick    Specimen: Urine   Result Value Ref Range    Color Straw Yellow, Straw, Dark Yellow, Laurie    Clarity, UA Clear Clear    Glucose, UA Negative Negative, 1000 mg/dL (3+) mg/dL    Bilirubin Negative Negative    Ketones, UA Negative Negative    Specific Gravity  1.005 1.005 - 1.030    Blood, UA Trace (A) Negative    pH, Urine 6.5 5.0 - 8.0    Protein, POC Negative Negative mg/dL    Urobilinogen, UA Normal Normal    Leukocytes Negative Negative    Nitrite, UA Negative Negative   Liquid-based Pap Smear, Screening    Specimen: Cervix; ThinPrep Vial   Result Value Ref Range    Case Report       Gynecologic Cytology Report                       Case: [id number]                                  Authorizing Provider:  Nita Solomon    Collected:            06/25/2021 02:11 PM                                 JACINDA Goyal                                                                  Ordering Location[facility]     [facility]     Received:            06/25/2021 03:44 PM                                 [facility]                                                                 First Screen:          Paul Baires                                                             Specimen:    Sendout to [facility], Cervix                                                                     Interpretation See attached report    Results for orders placed or performed during the hospital encounter of 10/19/20   Duplex Venous Lower Extremity - Bilateral CAR   Result Value Ref Range    Right Common Femoral Augment Y     Right Common Femoral Compress C     Right Saphenofemoral Junction Augment Y     Right Saphenofemoral Junction Compress C     Right Profunda Femoral Augment Y     Right Proximal Femoral Augment Y     Right Proximal Femoral Competent Y     Right Proximal Femoral Compress C     Right Mid Femoral Augment Y     Right Mid Femoral Compress C     Right Distal Femoral Augment Y     Right Distal Femoral Compress C     Right Popliteal Augment Y     Right Popliteal Compress C     Right Posterior Tibial Augment Y     Right Peroneal Augment Y     Right Greater Saph AK Augment Y     Right Greater Saph AK Competent Y     Right Greater Saph AK Compress C     Right Greater Saph BK Augment Y     Right Greater Saph BK Competent Y     Right Greater Saph BK Compress C     Left Common Femoral Augment Y     Left Common Femoral Competent Y     Left Common Femoral Compress C     Left Saphenofemoral Junction Augment Y     Left Saphenofemoral Junction Compress C     Left Profunda Femoral Augment Y     Left Proximal Femoral Augment Y     Left Proximal Femoral Competent Y     Left Proximal Femoral Compress C     Left Mid Femoral Augment Y     Left Mid Femoral Compress C     Left Distal  Femoral Augment Y     Left Distal Femoral Compress C     Left Popliteal Augment Y     Left Popliteal Compress C     Left Posterior Tibial Augment Y     Left Peroneal Augment Y     Left Greater Saph AK Augment Y     Left Greater Saph AK Competent Y     Left Greater Saph AK Compress C     Left Greater Saph BK Augment Y     Left Greater Saph BK Competent Y     Left Greater Saph BK Compress C      *Note: Due to a large number of results and/or encounters for the requested time period, some results have not been displayed. A complete set of results can be found in Results Review.

## 2023-04-12 ENCOUNTER — LAB (OUTPATIENT)
Dept: LAB | Facility: OTHER | Age: 63
End: 2023-04-12
Payer: OTHER GOVERNMENT

## 2023-04-12 ENCOUNTER — OFFICE VISIT (OUTPATIENT)
Dept: OBSTETRICS AND GYNECOLOGY | Facility: CLINIC | Age: 63
End: 2023-04-12
Payer: OTHER GOVERNMENT

## 2023-04-12 VITALS
BODY MASS INDEX: 22.43 KG/M2 | HEART RATE: 77 BPM | SYSTOLIC BLOOD PRESSURE: 106 MMHG | HEIGHT: 63 IN | DIASTOLIC BLOOD PRESSURE: 68 MMHG | WEIGHT: 126.6 LBS

## 2023-04-12 DIAGNOSIS — R39.15 URINARY URGENCY: ICD-10-CM

## 2023-04-12 DIAGNOSIS — Z01.419 ENCOUNTER FOR WELL WOMAN EXAM WITH ROUTINE GYNECOLOGICAL EXAM: Primary | ICD-10-CM

## 2023-04-12 LAB
BILIRUB UR QL STRIP: NEGATIVE
CLARITY UR: CLEAR
COLOR UR: YELLOW
GLUCOSE UR STRIP-MCNC: NEGATIVE MG/DL
HGB UR QL STRIP.AUTO: NEGATIVE
KETONES UR QL STRIP: NEGATIVE
LEUKOCYTE ESTERASE UR QL STRIP.AUTO: NEGATIVE
NITRITE UR QL STRIP: NEGATIVE
PH UR STRIP.AUTO: 7.5 [PH] (ref 5.5–8)
PROT UR QL STRIP: NEGATIVE
SP GR UR STRIP: 1.01 (ref 1–1.03)
UROBILINOGEN UR QL STRIP: NORMAL

## 2023-04-12 PROCEDURE — 99396 PREV VISIT EST AGE 40-64: CPT | Performed by: NURSE PRACTITIONER

## 2023-04-12 PROCEDURE — 81003 URINALYSIS AUTO W/O SCOPE: CPT | Performed by: NURSE PRACTITIONER

## 2023-04-12 RX ORDER — OXYBUTYNIN CHLORIDE 5 MG/1
5 TABLET, EXTENDED RELEASE ORAL DAILY
Qty: 30 TABLET | Refills: 11 | Status: SHIPPED | OUTPATIENT
Start: 2023-04-12

## 2023-04-12 NOTE — PROGRESS NOTES
Subjective   Chief Complaint   Patient presents with   • Gynecologic Exam     WWCAREN Mayfield is a 62 y.o. year old  presenting to be seen for her annual exam.  Pt has concerns about urinary urgency and frequency. Comes and goes. No dysuria, hematuria, flank pain. No incontinence. Pt had TLH, BSO, TVT and posterior repair 21. She recovered well from surgery.      Pt is under a lot of stress.  fell and is wheel chair bound now. She is struggling financially and working to get him disability.     She is not sexually active.  In the past 12 months there has not been new sexual partners.  Condoms are not typically used.  She would not like to be screened for STD's at today's exam.     She exercises regularly: yes.  She wears her seat belt:yes.  She has concerns about domestic violence: no.  She is taking Vit D and Calcium:yes  Last colonoscopy: 11/10/2017, scheduled 2023  Last DEXA: Never  Last MM23, BIRADS 0, scheduled for additional left views Monday  Last PAP: 21  Hx of abnormal pap: No      NATURAL MENOPAUSE  LMP: mid 40s    OB History        3    Para   3    Term   3            AB        Living   3       SAB        IAB        Ectopic        Molar        Multiple        Live Births                  The following portions of the patient's history were reviewed and updated as appropriate:problem list, current medications, allergies, past family history, past medical history, past social history and past surgical history.    Social History    Tobacco Use      Smoking status: Every Day        Packs/day: 1.00        Years: 35.00        Pack years: 35        Types: Cigarettes        Passive exposure: Current      Smokeless tobacco: Never      Review of Systems   Constitutional: Negative.  Negative for chills and fever.   Respiratory: Negative.    Cardiovascular: Negative.    Gastrointestinal: Negative.  Negative for constipation and diarrhea.   Endocrine:  "Negative.    Genitourinary: Positive for frequency and urgency. Negative for difficulty urinating, dysuria, flank pain, hematuria, pelvic pain, vaginal bleeding, vaginal discharge and vaginal pain.   Skin: Negative.    Neurological: Negative for dizziness, syncope, light-headedness and headaches.   Psychiatric/Behavioral: Negative for suicidal ideas. The patient is nervous/anxious.         Stress         Objective   /68   Pulse 77   Ht 160 cm (63\")   Wt 57.4 kg (126 lb 9.6 oz)   LMP  (LMP Unknown) Comment: hysterectomy w BSO 2021  Breastfeeding No   BMI 22.43 kg/m²     General:  well developed; well nourished  no acute distress   Skin:  No suspicious lesions seen   Cardiac: Heart sounds are normal.  Regular rate and rhythm without murmur, gallop or rub.   Resp:  Normal expansion.  Clear to auscultation.  No rales, rhonchi, or wheezing.   Thyroid: not examined   Breasts:  Examined in supine position  Symmetric without masses or skin dimpling  Nipples normal without inversion, lesions or discharge  There are no palpable axillary nodes  Fibrocystic changes are present both breasts without a discrete mass   Abdomen: soft, non-tender; no masses  no umbilical or inginual hernias are present  no hepato-splenomegaly  Normal findings: bowel sounds normal   Psych: alert,oriented, in NAD with a full range of affect, normal behavior and no psychotic features, mildly tearful discussing her life stressors right now   Pelvis: Clinical staff was present for exam  External genitalia:  normal appearance of the external genitalia including Bartholin's and Emerald's glands. small skin tag note on left buttock adjacent to anus  :  urethral meatus normal; urethral hypermobility is absent.  Vaginal:  normal pink mucosa without lesions.   Cervix:  absent  Uterus:  absent  Adnexa:  absent .  Rectal:  anus visually normal appearing  No cystocele or rectocele     Lab Review   UA   Brief Urine Lab Results  (Last result in the past " 365 days)      Color   Clarity   Blood   Leuk Est   Nitrite   Protein   CREAT   Urine HCG        04/12/23 0957 Yellow   Clear   Negative   Negative   Negative   Negative               Imaging  Mammogram report        Diagnoses and all orders for this visit:    1. Encounter for well woman exam with routine gynecological exam (Primary)  -     LIQUID-BASED PAP SMEAR, [facility] ([facility])    2. Urinary urgency  -     Urinalysis With Culture If Indicated - Urine, Clean Catch  -     oxybutynin XL (DITROPAN-XL) 5 MG 24 hr tablet; Take 1 tablet by mouth Daily.  Dispense: 30 tablet; Refill: 11    Pap results: I will send card in mail or call if abnormal. RTC q 2 yrs for well woman exams.     SBE encouraged monthly. MMG was just completed and additional views requested and scheduled Monday. Colonoscopy is up to date and next scheduled 6/1.     She is up to date and already scheduled in May with PCP. Pt is a healthy weight and is no longer having unexplained weight loss.    UA negative for infection. I suspect mild OAB. We will start with oxybutynin XL 5mg once daily. Discussed potential anticholinergic side effects and pt will monitor. We can try switching to myrbetriq if these occur. She will call office and request this change PRN.  I reassured pt of improved post surgical changes since I last saw her.     Pt is under significant stress. I encouraged her to continue following up with her PCP for medication changes PRN to help her cope.     This note was electronically signed.    Nita Solomon, APRN  April 12, 2023

## 2023-04-14 LAB — REF LAB TEST METHOD: NORMAL

## 2023-04-28 ENCOUNTER — LAB (OUTPATIENT)
Dept: LAB | Facility: OTHER | Age: 63
End: 2023-04-28
Payer: OTHER GOVERNMENT

## 2023-04-28 DIAGNOSIS — E78.2 MIXED HYPERLIPIDEMIA: Chronic | ICD-10-CM

## 2023-04-28 DIAGNOSIS — J43.2 CENTRILOBULAR EMPHYSEMA: Chronic | ICD-10-CM

## 2023-04-28 DIAGNOSIS — R53.83 FATIGUE, UNSPECIFIED TYPE: ICD-10-CM

## 2023-04-28 LAB
ALBUMIN SERPL-MCNC: 3.9 G/DL (ref 3.5–5)
ALBUMIN/GLOB SERPL: 1.3 G/DL (ref 1.1–1.8)
ALP SERPL-CCNC: 68 U/L (ref 38–126)
ALT SERPL W P-5'-P-CCNC: 18 U/L
ANION GAP SERPL CALCULATED.3IONS-SCNC: 4 MMOL/L (ref 5–15)
AST SERPL-CCNC: 23 U/L (ref 14–36)
BASOPHILS # BLD AUTO: 0.07 10*3/MM3 (ref 0–0.2)
BASOPHILS NFR BLD AUTO: 1.1 % (ref 0–1.5)
BILIRUB SERPL-MCNC: 0.4 MG/DL (ref 0.2–1.3)
BUN SERPL-MCNC: 12 MG/DL (ref 7–23)
BUN/CREAT SERPL: 19.4 (ref 7–25)
CALCIUM SPEC-SCNC: 8.6 MG/DL (ref 8.4–10.2)
CHLORIDE SERPL-SCNC: 106 MMOL/L (ref 101–112)
CHOLEST SERPL-MCNC: 224 MG/DL (ref 150–200)
CO2 SERPL-SCNC: 29 MMOL/L (ref 22–30)
CREAT SERPL-MCNC: 0.62 MG/DL (ref 0.52–1.04)
DEPRECATED RDW RBC AUTO: 45.8 FL (ref 37–54)
EGFRCR SERPLBLD CKD-EPI 2021: 102.1 ML/MIN/1.73
EOSINOPHIL # BLD AUTO: 0.22 10*3/MM3 (ref 0–0.4)
EOSINOPHIL NFR BLD AUTO: 3.5 % (ref 0.3–6.2)
ERYTHROCYTE [DISTWIDTH] IN BLOOD BY AUTOMATED COUNT: 14 % (ref 12.3–15.4)
GLOBULIN UR ELPH-MCNC: 2.9 GM/DL (ref 2.3–3.5)
GLUCOSE SERPL-MCNC: 96 MG/DL (ref 70–99)
HCT VFR BLD AUTO: 43.2 % (ref 34–46.6)
HDLC SERPL-MCNC: 54 MG/DL (ref 40–59)
HGB BLD-MCNC: 14.1 G/DL (ref 12–15.9)
LDLC SERPL CALC-MCNC: 154 MG/DL
LDLC/HDLC SERPL: 2.82 {RATIO} (ref 0–3.22)
LYMPHOCYTES # BLD AUTO: 2.72 10*3/MM3 (ref 0.7–3.1)
LYMPHOCYTES NFR BLD AUTO: 43.7 % (ref 19.6–45.3)
MCH RBC QN AUTO: 29.6 PG (ref 26.6–33)
MCHC RBC AUTO-ENTMCNC: 32.6 G/DL (ref 31.5–35.7)
MCV RBC AUTO: 90.6 FL (ref 79–97)
MONOCYTES # BLD AUTO: 0.31 10*3/MM3 (ref 0.1–0.9)
MONOCYTES NFR BLD AUTO: 5 % (ref 5–12)
NEUTROPHILS NFR BLD AUTO: 2.91 10*3/MM3 (ref 1.7–7)
NEUTROPHILS NFR BLD AUTO: 46.7 % (ref 42.7–76)
PLATELET # BLD AUTO: 180 10*3/MM3 (ref 140–450)
PMV BLD AUTO: 9.6 FL (ref 6–12)
POTASSIUM SERPL-SCNC: 4.1 MMOL/L (ref 3.4–5)
PROT SERPL-MCNC: 6.8 G/DL (ref 6.3–8.6)
RBC # BLD AUTO: 4.77 10*6/MM3 (ref 3.77–5.28)
SODIUM SERPL-SCNC: 139 MMOL/L (ref 137–145)
TRIGL SERPL-MCNC: 89 MG/DL
TSH SERPL DL<=0.05 MIU/L-ACNC: 1.48 UIU/ML (ref 0.27–4.2)
VIT B12 BLD-MCNC: 808 PG/ML (ref 211–946)
VLDLC SERPL-MCNC: 16 MG/DL (ref 5–40)
WBC NRBC COR # BLD: 6.23 10*3/MM3 (ref 3.4–10.8)

## 2023-04-28 PROCEDURE — 85025 COMPLETE CBC W/AUTO DIFF WBC: CPT | Performed by: INTERNAL MEDICINE

## 2023-04-28 PROCEDURE — 80061 LIPID PANEL: CPT | Performed by: INTERNAL MEDICINE

## 2023-04-28 PROCEDURE — 82607 VITAMIN B-12: CPT | Performed by: INTERNAL MEDICINE

## 2023-04-28 PROCEDURE — 36415 COLL VENOUS BLD VENIPUNCTURE: CPT | Performed by: INTERNAL MEDICINE

## 2023-04-28 PROCEDURE — 84443 ASSAY THYROID STIM HORMONE: CPT | Performed by: INTERNAL MEDICINE

## 2023-04-28 PROCEDURE — 80053 COMPREHEN METABOLIC PANEL: CPT | Performed by: INTERNAL MEDICINE

## 2023-05-03 ENCOUNTER — OFFICE VISIT (OUTPATIENT)
Dept: FAMILY MEDICINE CLINIC | Facility: CLINIC | Age: 63
End: 2023-05-03
Payer: OTHER GOVERNMENT

## 2023-05-03 VITALS
WEIGHT: 128 LBS | DIASTOLIC BLOOD PRESSURE: 70 MMHG | HEIGHT: 63 IN | TEMPERATURE: 97.1 F | BODY MASS INDEX: 22.68 KG/M2 | SYSTOLIC BLOOD PRESSURE: 100 MMHG | HEART RATE: 82 BPM | OXYGEN SATURATION: 96 %

## 2023-05-03 DIAGNOSIS — F33.1 MAJOR DEPRESSIVE DISORDER, RECURRENT, MODERATE: Chronic | ICD-10-CM

## 2023-05-03 DIAGNOSIS — K21.00 GASTROESOPHAGEAL REFLUX DISEASE WITH ESOPHAGITIS, UNSPECIFIED WHETHER HEMORRHAGE: Chronic | ICD-10-CM

## 2023-05-03 DIAGNOSIS — K59.04 CHRONIC IDIOPATHIC CONSTIPATION: Chronic | ICD-10-CM

## 2023-05-03 DIAGNOSIS — E78.2 MIXED HYPERLIPIDEMIA: Primary | Chronic | ICD-10-CM

## 2023-05-03 DIAGNOSIS — J43.2 CENTRILOBULAR EMPHYSEMA: Chronic | ICD-10-CM

## 2023-05-03 DIAGNOSIS — F41.1 GAD (GENERALIZED ANXIETY DISORDER): Chronic | ICD-10-CM

## 2023-05-03 DIAGNOSIS — F17.218 NICOTINE DEPENDENCE, CIGARETTES, WITH OTHER NICOTINE-INDUCED DISORDERS: ICD-10-CM

## 2023-05-03 RX ORDER — ROSUVASTATIN CALCIUM 10 MG/1
10 TABLET, COATED ORAL DAILY
Qty: 90 TABLET | Refills: 3 | Status: SHIPPED | OUTPATIENT
Start: 2023-05-03

## 2023-05-03 RX ORDER — CHOLECALCIFEROL (VITAMIN D3) 125 MCG
100 CAPSULE ORAL DAILY
COMMUNITY

## 2023-05-03 RX ORDER — ROSUVASTATIN CALCIUM 10 MG/1
10 TABLET, COATED ORAL DAILY
Qty: 90 TABLET | Refills: 3 | Status: SHIPPED | OUTPATIENT
Start: 2023-05-03 | End: 2023-05-03 | Stop reason: SDUPTHER

## 2023-05-03 NOTE — PROGRESS NOTES
"Chief Complaint  6 month follow up    Subjective        History of Present Illness     Reyna Mayfield presents to the office for 6-month follow up on chronic medical issues including hyperlipidemia, chronic venous insufficiency, OAB, tobacco abuse and emphysema among other issues.  She sees Dr. Masters/Renato Ugarte, Gi service, for GI issues.  She had EGD and colonoscopy on 11/10/2017 by Dr. Masters, GI, showing esophagitis, gastritis, hemorrhoids, hyperplastic polyp, and diverticulosis with 5-year colonoscopy recommended due to her history of colon polyps.  She is scheduled for repeat colonoscopy with Dr. Masters on 06/15/2023.  GERD is doing well on the Pepcid and Protonix.    Patient continues on combination of Lexapro and Wellbutrin with adequate control of MALISSA/depression, although, she continues to have quite a bit of stress with work and helping care for her .       Patient required diagnostic mammogram and ultrasound to follow up on abnormal mammogram.  Patient was relieved to learn diagnostic images show the asymmetry to be fibroglandular tissue.     Weight is up 3 pounds in the past six months.  BP and HR at goal.      Total and LDL cholesterol is above goal and despite having good HDL at 54, she has unfavorable cholesterol ratio 2.8.  We reviewed patient's risk factors and recommended starting patient on statin therapy after reviewing side effects and benefits.  Her leg cramps have improved, although, she continues to have episodic leg cramps.        Other lab results are reviewed with the patient today.  CBC unremarkable.  Vitamin B-12 at goal. Potassium at goal despite stopping oral potassium. Normal renal and liver function.  Cholesterol above goal as above.       Objective   Vital Signs:  /70 (BP Location: Left arm, Patient Position: Sitting, Cuff Size: Adult)   Pulse 82   Temp 97.1 °F (36.2 °C) (Tympanic)   Ht 160 cm (63\")   Wt 58.1 kg (128 lb)   SpO2 96%   BMI 22.67 kg/m² " "  Estimated body mass index is 22.67 kg/m² as calculated from the following:    Height as of this encounter: 160 cm (63\").    Weight as of this encounter: 58.1 kg (128 lb).       BMI is within normal parameters. No other follow-up for BMI required.      Physical Exam  Vitals reviewed.   Constitutional:       General: She is not in acute distress.     Appearance: She is well-developed.      Comments: Pleasant female   HENT:      Head: Normocephalic and atraumatic.      Nose:      Right Sinus: No maxillary sinus tenderness or frontal sinus tenderness.      Left Sinus: No maxillary sinus tenderness or frontal sinus tenderness.      Mouth/Throat:      Mouth: No oral lesions.      Pharynx: Uvula midline.      Tonsils: No tonsillar exudate.   Eyes:      Conjunctiva/sclera: Conjunctivae normal.      Pupils: Pupils are equal, round, and reactive to light.   Neck:      Thyroid: No thyroid mass or thyromegaly.      Vascular: No carotid bruit or JVD.      Trachea: Trachea normal. No tracheal deviation.   Cardiovascular:      Rate and Rhythm: Normal rate and regular rhythm.  No extrasystoles are present.     Chest Wall: PMI is not displaced.      Heart sounds: Normal heart sounds. No murmur heard.  Pulmonary:      Effort: Pulmonary effort is normal. No accessory muscle usage or respiratory distress.      Breath sounds: Normal breath sounds. No decreased breath sounds, wheezing, rhonchi or rales.      Comments: Chronic lung sounds.   Abdominal:      General: Bowel sounds are normal. There is no distension.      Palpations: Abdomen is soft.      Tenderness: There is no abdominal tenderness.   Musculoskeletal:      Cervical back: Neck supple.   Lymphadenopathy:      Cervical: No cervical adenopathy.   Skin:     General: Skin is warm and dry.      Findings: No rash.      Nails: There is no clubbing.   Neurological:      Mental Status: She is alert and oriented to person, place, and time.      Cranial Nerves: No cranial nerve " deficit.      Coordination: Coordination normal.   Psychiatric:         Speech: Speech normal.         Behavior: Behavior normal.         Thought Content: Thought content normal.         Judgment: Judgment normal.            Result Review :    CMP        2/5/2023    07:40 4/28/2023    08:04   CMP   Glucose  96     Glucose 107         BUN 14      12     Creatinine 0.7      0.62     EGFR  102.1     Sodium 140      139     Potassium 4.7      4.1     Chloride 104      106     Calcium 8.7      8.6     Total Protein 6.7      6.8     Albumin 3.5      3.9     Globulin  2.9     Total Bilirubin 0.40      0.4     Alkaline Phosphatase 80      68     AST (SGOT) 26      23     ALT (SGPT) 28      18     Albumin/Globulin Ratio  1.3     BUN/Creatinine Ratio  19.4     Anion Gap  4.0         This result is from an external source.     CBC w/diff        4/28/2023    08:04   CBC w/Diff   WBC 6.23     RBC 4.77     Hemoglobin 14.1     Hematocrit 43.2     MCV 90.6     MCH 29.6     MCHC 32.6     RDW 14.0     Platelets 180     Neutrophil Rel % 46.7     Lymphocyte Rel % 43.7     Monocyte Rel % 5.0     Eosinophil Rel % 3.5     Basophil Rel % 1.1       Lipid Panel        10/7/2022    07:55 4/28/2023    08:04   Lipid Panel   Total Cholesterol 229   224     Triglycerides 116   89     HDL Cholesterol 59   54     VLDL Cholesterol 21   16     LDL Cholesterol  149   154     LDL/HDL Ratio 2.49   2.82       TSH        4/28/2023    08:04   TSH   TSH 1.480           Data reviewed: GI studies Colonoscopy             Assessment and Plan   Diagnoses and all orders for this visit:    1. Mixed hyperlipidemia (Primary)  -     Comprehensive Metabolic Panel; Future  -     Lipid Panel; Future    2. Chronic idiopathic constipation  -     CBC Auto Differential; Future  -     Comprehensive Metabolic Panel; Future    3. Gastroesophageal reflux disease with esophagitis, unspecified whether hemorrhage  -     CBC Auto Differential; Future    4. Major depressive  disorder, recurrent, moderate    5. MALISSA (generalized anxiety disorder)    6. Centrilobular emphysema  -     CBC Auto Differential; Future    7. Nicotine dependence, cigarettes, with other nicotine-induced disorders  -     CBC Auto Differential; Future    Other orders  -     Discontinue: rosuvastatin (Crestor) 10 MG tablet; Take 1 tablet by mouth Daily. For cholesterol  Dispense: 90 tablet; Refill: 3  -     rosuvastatin (Crestor) 10 MG tablet; Take 1 tablet by mouth Daily. For cholesterol  Dispense: 90 tablet; Refill: 3                  To help lower cholesterol, prescription sent for Crestor 10 mg to take one tablet q.o.d. increasing to daily if tolerated.   Since she already has a tendency to have leg cramps, I recommended she take Co Q-10 100-200 mg daily to improve tolerability with the statin.       I advised the patient of the risks in continuing to use tobacco products.  Patient is urged to stop smoking and never start again.     Continue the Wellbutrin and Lexapro for MALISSA/depression.  Stable.     Continue the Pepcid and Protonix for GERD, adequately controlled.  She is scheduled for colonoscopy with Dr. Masters next month (06/15/2023)      Return in 6 months for routine follow up with fasting labs one week prior or sooner if needed.     Scribed for Dr. Cochran by Mihaela Calderón [facility].       Follow Up   Return in about 6 months (around 11/3/2023) for Follow up in six months with labs one week prior., Next scheduled follow up - labs 1 week prior.  Patient was given instructions and counseling regarding her condition or for health maintenance advice. Please see specific information pulled into the AVS if appropriate.

## (undated) DEVICE — SINGLE-USE BIOPSY FORCEPS: Brand: RADIAL JAW 4

## (undated) DEVICE — Device: Brand: DISPOSABLE ELECTROSURGICAL SNARE

## (undated) DEVICE — BITEBLOCK ENDO W/STRAP 60F A/ LF DISP

## (undated) DEVICE — TRAP SXN POLYP QUICKCATCH LF

## (undated) DEVICE — CANN SMPL SOFTECH BIFLO ETCO2 A/M 7FT